# Patient Record
Sex: MALE | Race: ASIAN | NOT HISPANIC OR LATINO
[De-identification: names, ages, dates, MRNs, and addresses within clinical notes are randomized per-mention and may not be internally consistent; named-entity substitution may affect disease eponyms.]

---

## 2017-01-12 ENCOUNTER — APPOINTMENT (OUTPATIENT)
Dept: CARDIOLOGY | Facility: HOSPITAL | Age: 57
End: 2017-01-12

## 2017-01-12 VITALS
DIASTOLIC BLOOD PRESSURE: 92 MMHG | OXYGEN SATURATION: 99 % | RESPIRATION RATE: 15 BRPM | HEART RATE: 62 BPM | SYSTOLIC BLOOD PRESSURE: 171 MMHG

## 2017-01-12 VITALS
BODY MASS INDEX: 28.25 KG/M2 | HEART RATE: 72 BPM | RESPIRATION RATE: 15 BRPM | WEIGHT: 175 LBS | SYSTOLIC BLOOD PRESSURE: 196 MMHG | DIASTOLIC BLOOD PRESSURE: 109 MMHG | OXYGEN SATURATION: 98 %

## 2017-03-23 ENCOUNTER — APPOINTMENT (OUTPATIENT)
Dept: INTERNAL MEDICINE | Facility: CLINIC | Age: 57
End: 2017-03-23

## 2017-03-23 VITALS
SYSTOLIC BLOOD PRESSURE: 120 MMHG | HEART RATE: 64 BPM | DIASTOLIC BLOOD PRESSURE: 70 MMHG | HEIGHT: 66 IN | WEIGHT: 179.03 LBS | OXYGEN SATURATION: 98 % | BODY MASS INDEX: 28.77 KG/M2 | TEMPERATURE: 97.6 F

## 2017-03-24 LAB
ALT SERPL-CCNC: 21 U/L
BUN SERPL-MCNC: 17 MG/DL
CREAT SERPL-MCNC: 0.9 MG/DL
HBA1C MFR BLD HPLC: 7.1 %

## 2017-04-18 ENCOUNTER — APPOINTMENT (OUTPATIENT)
Dept: CHRONIC DISEASE MANAGEMENT | Facility: CLINIC | Age: 57
End: 2017-04-18

## 2017-05-04 ENCOUNTER — APPOINTMENT (OUTPATIENT)
Dept: CARDIOLOGY | Facility: HOSPITAL | Age: 57
End: 2017-05-04

## 2017-06-22 ENCOUNTER — APPOINTMENT (OUTPATIENT)
Dept: INTERNAL MEDICINE | Facility: CLINIC | Age: 57
End: 2017-06-22

## 2017-06-22 VITALS
OXYGEN SATURATION: 98 % | DIASTOLIC BLOOD PRESSURE: 77 MMHG | HEART RATE: 55 BPM | BODY MASS INDEX: 27.32 KG/M2 | WEIGHT: 170 LBS | TEMPERATURE: 97.5 F | SYSTOLIC BLOOD PRESSURE: 120 MMHG | HEIGHT: 66 IN

## 2017-06-23 LAB — HBA1C MFR BLD HPLC: 7.9 %

## 2017-07-21 ENCOUNTER — APPOINTMENT (OUTPATIENT)
Dept: OPHTHALMOLOGY | Facility: CLINIC | Age: 57
End: 2017-07-21

## 2017-07-27 ENCOUNTER — APPOINTMENT (OUTPATIENT)
Dept: CARDIOLOGY | Facility: HOSPITAL | Age: 57
End: 2017-07-27

## 2017-07-27 ENCOUNTER — NON-APPOINTMENT (OUTPATIENT)
Age: 57
End: 2017-07-27

## 2017-07-27 VITALS
DIASTOLIC BLOOD PRESSURE: 64 MMHG | SYSTOLIC BLOOD PRESSURE: 109 MMHG | RESPIRATION RATE: 14 BRPM | BODY MASS INDEX: 27.44 KG/M2 | HEART RATE: 60 BPM | OXYGEN SATURATION: 98 % | WEIGHT: 170 LBS

## 2017-08-09 ENCOUNTER — RX RENEWAL (OUTPATIENT)
Age: 57
End: 2017-08-09

## 2017-08-10 ENCOUNTER — APPOINTMENT (OUTPATIENT)
Dept: CARDIOLOGY | Facility: HOSPITAL | Age: 57
End: 2017-08-10

## 2017-09-06 ENCOUNTER — RX RENEWAL (OUTPATIENT)
Age: 57
End: 2017-09-06

## 2017-09-11 ENCOUNTER — RX RENEWAL (OUTPATIENT)
Age: 57
End: 2017-09-11

## 2017-10-03 ENCOUNTER — APPOINTMENT (OUTPATIENT)
Dept: INTERNAL MEDICINE | Facility: CLINIC | Age: 57
End: 2017-10-03

## 2017-11-02 ENCOUNTER — APPOINTMENT (OUTPATIENT)
Dept: CARDIOLOGY | Facility: HOSPITAL | Age: 57
End: 2017-11-02

## 2017-11-02 VITALS
HEART RATE: 58 BPM | WEIGHT: 168 LBS | BODY MASS INDEX: 27.12 KG/M2 | DIASTOLIC BLOOD PRESSURE: 79 MMHG | OXYGEN SATURATION: 98 % | SYSTOLIC BLOOD PRESSURE: 122 MMHG | RESPIRATION RATE: 15 BRPM

## 2017-11-03 ENCOUNTER — LABORATORY RESULT (OUTPATIENT)
Age: 57
End: 2017-11-03

## 2017-11-03 ENCOUNTER — APPOINTMENT (OUTPATIENT)
Dept: INTERNAL MEDICINE | Facility: CLINIC | Age: 57
End: 2017-11-03
Payer: MEDICAID

## 2017-11-03 VITALS
OXYGEN SATURATION: 98 % | WEIGHT: 168 LBS | DIASTOLIC BLOOD PRESSURE: 64 MMHG | RESPIRATION RATE: 14 BRPM | BODY MASS INDEX: 27 KG/M2 | TEMPERATURE: 97.3 F | HEIGHT: 66 IN | HEART RATE: 54 BPM | SYSTOLIC BLOOD PRESSURE: 112 MMHG

## 2017-11-03 DIAGNOSIS — Z92.29 PERSONAL HISTORY OF OTHER DRUG THERAPY: ICD-10-CM

## 2017-11-03 DIAGNOSIS — Z87.19 PERSONAL HISTORY OF OTHER DISEASES OF THE DIGESTIVE SYSTEM: ICD-10-CM

## 2017-11-03 DIAGNOSIS — Z86.19 PERSONAL HISTORY OF OTHER INFECTIOUS AND PARASITIC DISEASES: ICD-10-CM

## 2017-11-03 DIAGNOSIS — Z78.9 OTHER SPECIFIED HEALTH STATUS: ICD-10-CM

## 2017-11-03 DIAGNOSIS — Z86.010 PERSONAL HISTORY OF COLONIC POLYPS: ICD-10-CM

## 2017-11-03 DIAGNOSIS — M25.519 PAIN IN UNSPECIFIED SHOULDER: ICD-10-CM

## 2017-11-03 DIAGNOSIS — Z80.9 FAMILY HISTORY OF MALIGNANT NEOPLASM, UNSPECIFIED: ICD-10-CM

## 2017-11-03 DIAGNOSIS — Z86.39 PERSONAL HISTORY OF OTHER ENDOCRINE, NUTRITIONAL AND METABOLIC DISEASE: ICD-10-CM

## 2017-11-03 DIAGNOSIS — K25.3 ACUTE GASTRIC ULCER W/OUT HEMORRHAGE OR PERFORATION: ICD-10-CM

## 2017-11-03 PROCEDURE — 36415 COLL VENOUS BLD VENIPUNCTURE: CPT

## 2017-11-03 PROCEDURE — 99396 PREV VISIT EST AGE 40-64: CPT | Mod: 25

## 2017-11-03 PROCEDURE — G0008: CPT

## 2017-11-03 PROCEDURE — 90688 IIV4 VACCINE SPLT 0.5 ML IM: CPT

## 2017-11-03 PROCEDURE — 96372 THER/PROPH/DIAG INJ SC/IM: CPT

## 2017-11-13 LAB
ALBUMIN SERPL ELPH-MCNC: 3.9 G/DL
ALP BLD-CCNC: 104 U/L
ALT SERPL-CCNC: 20 U/L
ANION GAP SERPL CALC-SCNC: 15 MMOL/L
APPEARANCE: CLEAR
AST SERPL-CCNC: 16 U/L
BASOPHILS # BLD AUTO: 0.02 K/UL
BASOPHILS NFR BLD AUTO: 0.3 %
BILIRUB SERPL-MCNC: 0.5 MG/DL
BILIRUBIN URINE: NEGATIVE
BLOOD URINE: NEGATIVE
BUN SERPL-MCNC: 25 MG/DL
C TRACH RRNA SPEC QL NAA+PROBE: NOT DETECTED
CALCIUM SERPL-MCNC: 9.5 MG/DL
CHLORIDE SERPL-SCNC: 104 MMOL/L
CHOLEST SERPL-MCNC: 188 MG/DL
CHOLEST/HDLC SERPL: 5.2 RATIO
CO2 SERPL-SCNC: 22 MMOL/L
COLOR: YELLOW
CREAT SERPL-MCNC: 1.03 MG/DL
CREAT SPEC-SCNC: 251 MG/DL
EOSINOPHIL # BLD AUTO: 0.1 K/UL
EOSINOPHIL NFR BLD AUTO: 1.7 %
GLUCOSE QUALITATIVE U: NEGATIVE MG/DL
GLUCOSE SERPL-MCNC: 124 MG/DL
HBA1C MFR BLD HPLC: 8.6 %
HBV CORE IGG+IGM SER QL: NONREACTIVE
HBV SURFACE AB SER QL: NONREACTIVE
HBV SURFACE AG SER QL: NONREACTIVE
HCT VFR BLD CALC: 39 %
HCV AB SER QL: NONREACTIVE
HCV S/CO RATIO: 0.45 S/CO
HDLC SERPL-MCNC: 36 MG/DL
HGB BLD-MCNC: 12.7 G/DL
HIV1+2 AB SPEC QL IA.RAPID: NONREACTIVE
IMM GRANULOCYTES NFR BLD AUTO: 0 %
KETONES URINE: NEGATIVE
LDLC SERPL CALC-MCNC: 112 MG/DL
LEUKOCYTE ESTERASE URINE: NEGATIVE
LYMPHOCYTES # BLD AUTO: 2.36 K/UL
LYMPHOCYTES NFR BLD AUTO: 39.5 %
MAN DIFF?: NORMAL
MCHC RBC-ENTMCNC: 28.5 PG
MCHC RBC-ENTMCNC: 32.6 GM/DL
MCV RBC AUTO: 87.4 FL
MICROALBUMIN 24H UR DL<=1MG/L-MCNC: 32.8 MG/DL
MICROALBUMIN/CREAT 24H UR-RTO: 131 MG/G
MONOCYTES # BLD AUTO: 0.36 K/UL
MONOCYTES NFR BLD AUTO: 6 %
N GONORRHOEA RRNA SPEC QL NAA+PROBE: NOT DETECTED
NEUTROPHILS # BLD AUTO: 3.13 K/UL
NEUTROPHILS NFR BLD AUTO: 52.5 %
NITRITE URINE: NEGATIVE
PH URINE: 5
PLATELET # BLD AUTO: 227 K/UL
POTASSIUM SERPL-SCNC: 4.6 MMOL/L
PROT SERPL-MCNC: 7.4 G/DL
PROTEIN URINE: 100 MG/DL
PSA SERPL-MCNC: 1 NG/ML
RBC # BLD: 4.46 M/UL
RBC # FLD: 14.3 %
SODIUM SERPL-SCNC: 141 MMOL/L
SOURCE AMPLIFICATION: NORMAL
SPECIFIC GRAVITY URINE: 1.03
T PALLIDUM AB SER QL IA: NEGATIVE
TRIGL SERPL-MCNC: 201 MG/DL
TSH SERPL-ACNC: 1.45 UIU/ML
UROBILINOGEN URINE: NEGATIVE MG/DL
VIT B12 SERPL-MCNC: 1146 PG/ML
WBC # FLD AUTO: 5.97 K/UL

## 2017-11-15 LAB — HEMOCCULT STL QL IA: NEGATIVE

## 2018-02-01 ENCOUNTER — APPOINTMENT (OUTPATIENT)
Dept: CARDIOLOGY | Facility: HOSPITAL | Age: 58
End: 2018-02-01

## 2018-02-01 VITALS — DIASTOLIC BLOOD PRESSURE: 92 MMHG | HEART RATE: 63 BPM | SYSTOLIC BLOOD PRESSURE: 139 MMHG

## 2018-02-01 VITALS
WEIGHT: 170 LBS | OXYGEN SATURATION: 99 % | BODY MASS INDEX: 27.44 KG/M2 | RESPIRATION RATE: 12 BRPM | SYSTOLIC BLOOD PRESSURE: 158 MMHG | DIASTOLIC BLOOD PRESSURE: 94 MMHG | HEART RATE: 62 BPM

## 2018-02-05 ENCOUNTER — APPOINTMENT (OUTPATIENT)
Dept: INTERNAL MEDICINE | Facility: CLINIC | Age: 58
End: 2018-02-05
Payer: MEDICAID

## 2018-02-05 VITALS
TEMPERATURE: 97.6 F | BODY MASS INDEX: 27.32 KG/M2 | HEIGHT: 66 IN | SYSTOLIC BLOOD PRESSURE: 122 MMHG | RESPIRATION RATE: 14 BRPM | OXYGEN SATURATION: 98 % | DIASTOLIC BLOOD PRESSURE: 80 MMHG | HEART RATE: 54 BPM | WEIGHT: 170 LBS

## 2018-02-05 PROCEDURE — 36415 COLL VENOUS BLD VENIPUNCTURE: CPT

## 2018-02-05 PROCEDURE — 99215 OFFICE O/P EST HI 40 MIN: CPT | Mod: 25

## 2018-02-05 PROCEDURE — 90471 IMMUNIZATION ADMIN: CPT

## 2018-02-05 PROCEDURE — 90746 HEPB VACCINE 3 DOSE ADULT IM: CPT

## 2018-02-07 LAB
ANION GAP SERPL CALC-SCNC: 18 MMOL/L
BASOPHILS # BLD AUTO: 0.03 K/UL
BASOPHILS NFR BLD AUTO: 0.4 %
BUN SERPL-MCNC: 19 MG/DL
CALCIUM SERPL-MCNC: 9.6 MG/DL
CHLORIDE SERPL-SCNC: 102 MMOL/L
CHOLEST SERPL-MCNC: 160 MG/DL
CHOLEST/HDLC SERPL: 3.6 RATIO
CO2 SERPL-SCNC: 20 MMOL/L
CREAT SERPL-MCNC: 0.99 MG/DL
CREAT SPEC-SCNC: 249 MG/DL
CREAT/PROT UR: 0.4 RATIO
EOSINOPHIL # BLD AUTO: 0.16 K/UL
EOSINOPHIL NFR BLD AUTO: 2.4 %
GLUCOSE SERPL-MCNC: 96 MG/DL
HBA1C MFR BLD HPLC: 8.5 %
HCT VFR BLD CALC: 41 %
HDLC SERPL-MCNC: 45 MG/DL
HGB BLD-MCNC: 13.2 G/DL
IMM GRANULOCYTES NFR BLD AUTO: 0 %
LDLC SERPL CALC-MCNC: 87 MG/DL
LYMPHOCYTES # BLD AUTO: 2.85 K/UL
LYMPHOCYTES NFR BLD AUTO: 42.1 %
MAN DIFF?: NORMAL
MCHC RBC-ENTMCNC: 28.3 PG
MCHC RBC-ENTMCNC: 32.2 GM/DL
MCV RBC AUTO: 87.8 FL
MONOCYTES # BLD AUTO: 0.53 K/UL
MONOCYTES NFR BLD AUTO: 7.8 %
NEUTROPHILS # BLD AUTO: 3.2 K/UL
NEUTROPHILS NFR BLD AUTO: 47.3 %
PLATELET # BLD AUTO: 189 K/UL
POTASSIUM SERPL-SCNC: 4.6 MMOL/L
PROT UR-MCNC: 107 MG/DL
RBC # BLD: 4.67 M/UL
RBC # FLD: 14.9 %
SODIUM SERPL-SCNC: 140 MMOL/L
TRIGL SERPL-MCNC: 142 MG/DL
WBC # FLD AUTO: 6.77 K/UL

## 2018-04-25 ENCOUNTER — RX RENEWAL (OUTPATIENT)
Age: 58
End: 2018-04-25

## 2018-04-30 ENCOUNTER — RX RENEWAL (OUTPATIENT)
Age: 58
End: 2018-04-30

## 2018-05-03 ENCOUNTER — NON-APPOINTMENT (OUTPATIENT)
Age: 58
End: 2018-05-03

## 2018-05-03 ENCOUNTER — APPOINTMENT (OUTPATIENT)
Dept: CARDIOLOGY | Facility: HOSPITAL | Age: 58
End: 2018-05-03

## 2018-05-03 VITALS — SYSTOLIC BLOOD PRESSURE: 127 MMHG | DIASTOLIC BLOOD PRESSURE: 77 MMHG

## 2018-05-03 VITALS
HEART RATE: 59 BPM | BODY MASS INDEX: 26.95 KG/M2 | OXYGEN SATURATION: 97 % | WEIGHT: 167 LBS | RESPIRATION RATE: 12 BRPM | SYSTOLIC BLOOD PRESSURE: 144 MMHG | DIASTOLIC BLOOD PRESSURE: 76 MMHG

## 2018-05-09 ENCOUNTER — FORM ENCOUNTER (OUTPATIENT)
Age: 58
End: 2018-05-09

## 2018-05-10 ENCOUNTER — APPOINTMENT (OUTPATIENT)
Dept: RADIOLOGY | Facility: HOSPITAL | Age: 58
End: 2018-05-10

## 2018-05-22 ENCOUNTER — APPOINTMENT (OUTPATIENT)
Dept: INTERNAL MEDICINE | Facility: CLINIC | Age: 58
End: 2018-05-22
Payer: MEDICAID

## 2018-05-22 VITALS
SYSTOLIC BLOOD PRESSURE: 124 MMHG | RESPIRATION RATE: 14 BRPM | HEART RATE: 73 BPM | WEIGHT: 168 LBS | BODY MASS INDEX: 27 KG/M2 | TEMPERATURE: 98.1 F | HEIGHT: 66 IN | DIASTOLIC BLOOD PRESSURE: 70 MMHG | OXYGEN SATURATION: 98 %

## 2018-05-22 PROCEDURE — 36415 COLL VENOUS BLD VENIPUNCTURE: CPT

## 2018-05-22 PROCEDURE — 99215 OFFICE O/P EST HI 40 MIN: CPT | Mod: 25

## 2018-05-22 PROCEDURE — 90746 HEPB VACCINE 3 DOSE ADULT IM: CPT

## 2018-05-22 PROCEDURE — 90471 IMMUNIZATION ADMIN: CPT

## 2018-05-22 NOTE — REVIEW OF SYSTEMS
[Negative] : Psychiatric [see HPI] : see HPI [Fever] : no fever [Chills] : no chills [Feeling Poorly] : not feeling poorly [Feeling Tired] : not feeling tired [Chest Pain] : no chest pain [Palpitations] : no palpitations [Lower Ext Edema] : no extremity edema [SOB on Exertion] : no shortness of breath during exertion [Abdominal Pain] : no abdominal pain [Heartburn] : no heartburn [Melena] : no melena [Dizziness] : no dizziness [Fainting] : no fainting

## 2018-05-22 NOTE — PHYSICAL EXAM
[General Appearance - Alert] : alert [General Appearance - In No Acute Distress] : in no acute distress [Sclera] : the sclera and conjunctiva were normal [PERRL With Normal Accommodation] : pupils were equal in size, round, and reactive to light [Extraocular Movements] : extraocular movements were intact [Outer Ear] : the ears and nose were normal in appearance [Neck Appearance] : the appearance of the neck was normal [Thyroid Diffuse Enlargement] : the thyroid was not enlarged [Respiration, Rhythm And Depth] : normal respiratory rhythm and effort [Auscultation Breath Sounds / Voice Sounds] : lungs were clear to auscultation bilaterally [Heart Rate And Rhythm] : heart rate was normal and rhythm regular [Heart Sounds] : normal S1 and S2 [Murmurs] : no murmurs [Full Pulse] : the pedal pulses are present [Edema] : there was no peripheral edema [Bowel Sounds] : normal bowel sounds [Abdomen Soft] : soft [Abdomen Tenderness] : non-tender [Cervical Lymph Nodes Enlarged Posterior Bilaterally] : posterior cervical [Cervical Lymph Nodes Enlarged Anterior Bilaterally] : anterior cervical [Supraclavicular Lymph Nodes Enlarged Bilaterally] : supraclavicular [No CVA Tenderness] : no ~M costovertebral angle tenderness [No Spinal Tenderness] : no spinal tenderness [Abnormal Walk] : normal gait [Musculoskeletal - Swelling] : no joint swelling seen [No Focal Deficits] : no focal deficits [Oriented To Time, Place, And Person] : oriented to person, place, and time [Affect] : the affect was normal [Mood] : the mood was normal [Moist] : moist mucosa [Cobblestoning] : cobblestoning [] : no respiratory distress [Erythema] : no erythema [Exudate] : no exudate [FreeTextEntry1] : scattered freckles [Over the Past 2 Weeks, Have You Felt Down, Depressed, or Hopeless?] : 1.) Over the past 2 weeks, have you felt down, depressed, or hopeless? No [Over the Past 2 Weeks, Have You Felt Little Interest or Pleasure Doing Things?] : 2.) Over the past 2 weeks, have you felt little interest or pleasure doing things? No

## 2018-05-22 NOTE — HISTORY OF PRESENT ILLNESS
[FreeTextEntry1] : \par Feeling well, no complaints.\par Needs med refills.\par \par \par c/o cough on/off x 2 mo a/w postnasal drip and frequent throat clearing\par -occ OTC cough meds, not taken regularly\par -denies hx seasonal allergies\par -denies reflux, sob, CP, fever or chills.\par -hx negative cxr per cardio 5/18\par \par c/o neck pain x 3 d, only with neck movement, felt in on awakening some days, advil helps\par -denies HA, vision trouble\par \par hx CAD s/p CABG 12/2011, CHF, HTN, HLD-\par -followed by cardio- states last seen 5/18 no changes made, had negative cxr for c/o cough.\par -better with ADA/low salt and fat diet in recent months\par -denies HA, dizziness, CP, sob, leg edema, orthopnea or PND\par -followed by optho, seen 7/17- states told nl dilated exam and has f/u 7/18\par \par DM- reports diet adherence since last visit, cut down on carbs\par -on metformin, glimepiride and Onglyza- reports 100% compliant\par -home fs random: 130- 200; denies hypoglycemic sx's \par -followed by optho, last 7/17\par -followed by podiatry, seen 2016 told nl.  Denies foot pain or complaints.\par -not formal exercise, stays active\par -declines DM educator eval\par \par \par Denies GI or  complaints.\par \par

## 2018-05-22 NOTE — ASSESSMENT
[FreeTextEntry1] : \par cough- suspect 2/2 postnasal drip; well appearing\par -trial of flonase, escribed\par -advised trial of OTC antihistamine, advised to avoid "D" containing (declines Rx)\par -hx negative cxr 5/18\par -advised ENT referral if sx's persist, states will call back for this\par -advised to f/u if sx's worsen\par \par neck pain- suspect muscular\par -advised d/c advil, try Tyelnol prn\par -stretching, heat\par -to modify pillow as able\par \par hx CAD s/p CABG 12/2011, CHF, HTN, HLD- asx\par -11/17 Tchol 188   HDL 36, LFTs wnl\par -11/17 UA: +rt--> 2/18 prt/crt abnl, renal eval pending\par -6/16 echo: mild to mod LV dysfxn, inferoseptal hypokinesis\par -followed by cardio- states last seen 3/18, no changes made, has f/u 8/18\par -on ASA/lipitor/coreg/lasix/losartan (hx cough 2/2 ACE-I).  Importance of med adherence counseled- adverse outcome with nonadherence (ie MI, death) counseled.\par -followed by optho, seen 7/17- states told nl dilated exam and has f/u 7/18\par -low salt and fat diet advised\par \par DM- 2/18 A1c 8.5 (was 8.6); 11/17 microalbumin +\par -on metformin, glimepiride and Onglyza- taking regimen > 1 yr per pt.  Medication adherence counseled, adverse effects of nonadherence counseled.\par -on ARB\par -cont home fs monitoring\par -followed by optho, has f/u 7/18\par -followed by podiatry, seen 2016 told nl.  Denies foot pain or complaints.  DM foot exam wnl 11/17 CPE.\par -check A1c \par -ADA diet, exercise advised\par -declines DM educator referral\par \par hx anemia, B12 def- asx\par -11/17 B12 wnl, Hg 12.7 (stable)--> cbc wnl 2/18\par -11/17 FIT negative\par -on B12 supplement\par -hx screening colonoscopy 8/12: polyps, internal hemorrhoids, rec: repeat in 5 yrs (Dr. Davis)- GI f/u advised\par \par microscopic hematuria- asx\par - referral for eval given prior- pending\par \par overweight- \par -healthy eating, exercise and wt loss counseled.\par \par \par HCM\par -hx CPE 11/17\par -STD prevention with regular use of condoms counseled\par -hx flu shot 11/17\par -hx Tdap 2012\par -hx PVX 2012\par -hx prevnar 9/16\par -getting hep B series, for #3 today \par -hx screening colonoscopy 8/12: polyps, internal hemorrhoids, rec: repeat in 5 yrs (Dr. Davis)- GI f/u advised, hesitant, 11/17 FIT card- negative\par -yearly derm screening for skin cancer screening and regular use of sun block for skin cancer prevention counseled\par -cont'd smoking cessation encouraged\par \par Pt's cell: [phone]

## 2018-05-25 LAB
ANION GAP SERPL CALC-SCNC: 10 MMOL/L
BUN SERPL-MCNC: 18 MG/DL
CALCIUM SERPL-MCNC: 8.6 MG/DL
CHLORIDE SERPL-SCNC: 106 MMOL/L
CO2 SERPL-SCNC: 22 MMOL/L
CREAT SERPL-MCNC: 0.86 MG/DL
CREAT SPEC-SCNC: 309 MG/DL
GLUCOSE SERPL-MCNC: 237 MG/DL
HBA1C MFR BLD HPLC: 10.9 %
MICROALBUMIN 24H UR DL<=1MG/L-MCNC: 84.2 MG/DL
MICROALBUMIN/CREAT 24H UR-RTO: 272 MG/G
POTASSIUM SERPL-SCNC: 4.7 MMOL/L
SODIUM SERPL-SCNC: 138 MMOL/L

## 2018-07-23 ENCOUNTER — RX RENEWAL (OUTPATIENT)
Age: 58
End: 2018-07-23

## 2018-08-28 ENCOUNTER — APPOINTMENT (OUTPATIENT)
Dept: INTERNAL MEDICINE | Facility: CLINIC | Age: 58
End: 2018-08-28
Payer: MEDICAID

## 2018-08-28 VITALS
TEMPERATURE: 97.9 F | RESPIRATION RATE: 14 BRPM | HEART RATE: 60 BPM | WEIGHT: 171 LBS | BODY MASS INDEX: 27.48 KG/M2 | OXYGEN SATURATION: 97 % | DIASTOLIC BLOOD PRESSURE: 70 MMHG | HEIGHT: 66 IN | SYSTOLIC BLOOD PRESSURE: 120 MMHG

## 2018-08-28 DIAGNOSIS — Z87.09 PERSONAL HISTORY OF OTHER DISEASES OF THE RESPIRATORY SYSTEM: ICD-10-CM

## 2018-08-28 PROCEDURE — 36415 COLL VENOUS BLD VENIPUNCTURE: CPT

## 2018-08-28 PROCEDURE — 99214 OFFICE O/P EST MOD 30 MIN: CPT | Mod: 25

## 2018-08-28 RX ORDER — BLOOD-GLUCOSE METER
KIT MISCELLANEOUS
Qty: 1 | Refills: 0 | Status: DISCONTINUED | COMMUNITY
Start: 2017-06-22 | End: 2018-08-28

## 2018-08-28 NOTE — REVIEW OF SYSTEMS
[see HPI] : see HPI [Negative] : Musculoskeletal [Fever] : no fever [Chills] : no chills [Feeling Poorly] : not feeling poorly [Feeling Tired] : not feeling tired [Chest Pain] : no chest pain [Palpitations] : no palpitations [Lower Ext Edema] : no extremity edema [Cough] : no cough [SOB on Exertion] : no shortness of breath during exertion [Abdominal Pain] : no abdominal pain [Heartburn] : no heartburn [Melena] : no melena [Dizziness] : no dizziness [Fainting] : no fainting

## 2018-08-28 NOTE — PHYSICAL EXAM
[General Appearance - Alert] : alert [General Appearance - In No Acute Distress] : in no acute distress [Sclera] : the sclera and conjunctiva were normal [PERRL With Normal Accommodation] : pupils were equal in size, round, and reactive to light [Extraocular Movements] : extraocular movements were intact [Outer Ear] : the ears and nose were normal in appearance [Moist] : moist mucosa [Cobblestoning] : cobblestoning [Neck Appearance] : the appearance of the neck was normal [Thyroid Diffuse Enlargement] : the thyroid was not enlarged [Respiration, Rhythm And Depth] : normal respiratory rhythm and effort [Auscultation Breath Sounds / Voice Sounds] : lungs were clear to auscultation bilaterally [Heart Rate And Rhythm] : heart rate was normal and rhythm regular [Heart Sounds] : normal S1 and S2 [Murmurs] : no murmurs [Full Pulse] : the pedal pulses are present [Edema] : there was no peripheral edema [Bowel Sounds] : normal bowel sounds [Abdomen Soft] : soft [Abdomen Tenderness] : non-tender [Cervical Lymph Nodes Enlarged Posterior Bilaterally] : posterior cervical [Cervical Lymph Nodes Enlarged Anterior Bilaterally] : anterior cervical [Supraclavicular Lymph Nodes Enlarged Bilaterally] : supraclavicular [No CVA Tenderness] : no ~M costovertebral angle tenderness [No Spinal Tenderness] : no spinal tenderness [Abnormal Walk] : normal gait [Musculoskeletal - Swelling] : no joint swelling seen [] : no rash [No Focal Deficits] : no focal deficits [Oriented To Time, Place, And Person] : oriented to person, place, and time [Affect] : the affect was normal [Mood] : the mood was normal [Erythema] : no erythema [Exudate] : no exudate [Over the Past 2 Weeks, Have You Felt Down, Depressed, or Hopeless?] : 1.) Over the past 2 weeks, have you felt down, depressed, or hopeless? No [Over the Past 2 Weeks, Have You Felt Little Interest or Pleasure Doing Things?] : 2.) Over the past 2 weeks, have you felt little interest or pleasure doing things? No [Nasal Cavity] : the nasal mucosa and septum were normal [Oropharynx] : the oropharynx was normal [FreeTextEntry1] : scattered freckles

## 2018-08-28 NOTE — ASSESSMENT
[FreeTextEntry1] : \par hx cough- suspect 2/2 postnasal drip- self resolved per pt\par -advised trial of flonase prn\par -advised trial of OTC antihistamine, advised to avoid "D" containing (declines Rx)\par -hx negative cxr 5/18\par \par hx CAD s/p CABG 12/2011, CHF, HTN, HLD, proteinuria- asx\par -11/17 Tchol 188   HDL 36, LFTs wnl\par -11/17 UA: +prt--> 2/18 prt/crt abnl, renal eval pending.  US renal pending.\par -6/16 echo: mild to mod LV dysfxn, inferoseptal hypokinesis\par -5/18 bmp wnl\par -followed by cardio- states last seen 3/18, no changes made, has f/u 9/18\par -on ASA/lipitor/coreg/lasix/losartan (hx cough 2/2 ACE-I).  Importance of med adherence counseled- adverse outcome with nonadherence (ie MI, death) counseled.\par -followed by optho, seen 7/17- states told nl dilated exam - f/u pending\par -low salt and fat diet advised\par \par DM- 5/18 A1c 10.9 (was 8.5); 5/18 microalbumin +\par -on metformin, glimepiride and Onglyza- taking regimen > 1 yr per pt.  Medication adherence counseled, adverse effects of nonadherence counseled.\par -on ARB\par -cont home fs monitoring encouraged- Rx for machine and supplies escribed\par -followed by optho, f/u pending\par -followed by podiatry, seen 2016 told nl.  Denies foot pain or complaints.  DM foot exam wnl 11/17 CPE.\par -ADA diet, exercise advised\par -declines DM educator referral\par -check A1c and microalbumin\par \par hx anemia, B12 def- asx\par -11/17 B12 wnl, Hg 12.7 (stable)--> cbc wnl 2/18\par -11/17 FIT negative\par -on B12 supplement\par -hx screening colonoscopy 8/12: polyps, internal hemorrhoids, rec: repeat in 5 yrs (Dr. Davis)- GI f/u advised\par \par microscopic hematuria- asx\par - referral for eval given prior- pending\par \par overweight- \par -healthy eating, exercise and wt loss counseled.\par \par \par HCM\par -hx CPE 11/17, yearly advised\par -STD prevention with regular use of condoms counseled\par -hx flu shot 11/17\par -hx Tdap 2012\par -hx PVX 2012\par -hx prevnar 9/16\par -getting hep B series\par -hx screening colonoscopy 8/12: polyps, internal hemorrhoids, rec: repeat in 5 yrs (Dr. Davis)- GI f/u advised, hesitant, 11/17 FIT card- negative\par -yearly derm screening for skin cancer screening and regular use of sun block for skin cancer prevention counseled\par -cont'd smoking cessation encouraged\par \par Pt's cell: [phone]

## 2018-08-28 NOTE — HISTORY OF PRESENT ILLNESS
[FreeTextEntry1] : \par Feeling well.\par Needs med refills.\par \par \par hx cough on/off x 2 mo a/w postnasal drip and frequent throat clearing- self resolved since last visit, not using any medication, denies trial flonase\par -denies hx seasonal allergies\par -denies reflux, sob, CP, fever or chills.\par -hx negative cxr per cardio 5/18\par \par hx CAD s/p CABG 12/2011, CHF, HTN, HLD-\par -followed by cardio- states last seen 5/18 no changes made, had negative cxr for c/o cough.  Has f/u 9/18.\par -better with ADA/low salt and fat diet in recent months\par -denies HA, dizziness, CP, sob, leg edema, orthopnea or PND\par -followed by optho, seen 7/17- states told nl dilated exam, f/u pending\par \par DM- reports diet adherence since last visit, has low carb diet but admits to eating late due to work schedule\par -on metformin, glimepiride and Onglyza- reports 100% compliant\par -no recent home fs checks as machine not working- needs new.  Denies hypoglycemic sx's, polyuria or polydipsia. \par -followed by optho, last 7/17\par -followed by podiatry, seen 2016 told nl.  Denies foot pain or complaints.\par -not formal exercise, stays active\par -declines DM educator eval\par \par \par Denies GI or  complaints.\par \par

## 2018-08-31 LAB
ANION GAP SERPL CALC-SCNC: 12 MMOL/L
BUN SERPL-MCNC: 19 MG/DL
CALCIUM SERPL-MCNC: 8.8 MG/DL
CHLORIDE SERPL-SCNC: 103 MMOL/L
CO2 SERPL-SCNC: 19 MMOL/L
CREAT SERPL-MCNC: 0.74 MG/DL
CREAT SPEC-SCNC: 138 MG/DL
GLUCOSE SERPL-MCNC: 276 MG/DL
HBA1C MFR BLD HPLC: 10.1 %
MICROALBUMIN 24H UR DL<=1MG/L-MCNC: 87.8 MG/DL
MICROALBUMIN/CREAT 24H UR-RTO: 637 MG/G
POTASSIUM SERPL-SCNC: 4.6 MMOL/L
SODIUM SERPL-SCNC: 134 MMOL/L

## 2018-09-08 ENCOUNTER — RX RENEWAL (OUTPATIENT)
Age: 58
End: 2018-09-08

## 2018-09-27 ENCOUNTER — APPOINTMENT (OUTPATIENT)
Dept: CARDIOLOGY | Facility: HOSPITAL | Age: 58
End: 2018-09-27

## 2018-09-27 VITALS
BODY MASS INDEX: 27.44 KG/M2 | OXYGEN SATURATION: 97 % | WEIGHT: 170 LBS | SYSTOLIC BLOOD PRESSURE: 149 MMHG | RESPIRATION RATE: 12 BRPM | HEART RATE: 68 BPM | DIASTOLIC BLOOD PRESSURE: 82 MMHG

## 2018-09-27 VITALS — DIASTOLIC BLOOD PRESSURE: 78 MMHG | SYSTOLIC BLOOD PRESSURE: 124 MMHG

## 2018-10-22 ENCOUNTER — APPOINTMENT (OUTPATIENT)
Dept: OPHTHALMOLOGY | Facility: CLINIC | Age: 58
End: 2018-10-22

## 2018-11-08 ENCOUNTER — MEDICATION RENEWAL (OUTPATIENT)
Age: 58
End: 2018-11-08

## 2018-11-26 ENCOUNTER — APPOINTMENT (OUTPATIENT)
Dept: OPHTHALMOLOGY | Facility: CLINIC | Age: 58
End: 2018-11-26

## 2018-12-27 ENCOUNTER — APPOINTMENT (OUTPATIENT)
Dept: CARDIOLOGY | Facility: HOSPITAL | Age: 58
End: 2018-12-27

## 2018-12-27 VITALS
BODY MASS INDEX: 28.41 KG/M2 | DIASTOLIC BLOOD PRESSURE: 75 MMHG | SYSTOLIC BLOOD PRESSURE: 149 MMHG | WEIGHT: 176 LBS | HEART RATE: 65 BPM | OXYGEN SATURATION: 98 % | RESPIRATION RATE: 14 BRPM

## 2019-01-03 ENCOUNTER — NON-APPOINTMENT (OUTPATIENT)
Age: 59
End: 2019-01-03

## 2019-01-04 NOTE — HISTORY OF PRESENT ILLNESS
[FreeTextEntry1] : 57-year-old M with a PMH of CAD S/P 4-vessel CABG (12/2011), mild-moderate LV dysfunction, HTN, dyslipidemia and diabetes following up in cardiology clinic for optimization of his CAD and heart failure regimen. No longer having cough, no fevers. No SOB or chest pain. No LE edema or palp. No orthopnea. No F/C. \par Has admitted to gaining 3 or 4 pounds over the holiday, has been eating a lot of sweets. Has not been walking as much as he used to.

## 2019-01-04 NOTE — REVIEW OF SYSTEMS
[Cough] : cough [Negative] : Psychiatric [Shortness Of Breath] : no shortness of breath [Dyspnea on exertion] : not dyspnea during exertion [Chest Pain] : no chest pain [Lower Ext Edema] : no extremity edema [Palpitations] : no palpitations

## 2019-01-04 NOTE — PHYSICAL EXAM
[Normal Appearance] : normal appearance [General Appearance - In No Acute Distress] : no acute distress [Normal Conjunctiva] : the conjunctiva exhibited no abnormalities [Normal Oral Mucosa] : normal oral mucosa [] : no respiratory distress [Auscultation Breath Sounds / Voice Sounds] : lungs were clear to auscultation bilaterally [Heart Sounds] : normal S1 and S2 [Murmurs] : no murmurs present [Edema] : no peripheral edema present [Bowel Sounds] : normal bowel sounds [Abdomen Soft] : soft [Abdomen Tenderness] : non-tender [Nail Clubbing] : no clubbing of the fingernails [Skin Turgor] : normal skin turgor [Affect] : the affect was normal [FreeTextEntry1] : No JVD

## 2019-01-04 NOTE — DISCUSSION/SUMMARY
[FreeTextEntry1] : Mr. Joanne Smyth is a 57 year old gentleman with a CAD status post 4-vessel CABG, HFrEF (ICM, mild-mod LV dysfunction) previously with a LifeVest, diabetes following up in cardiology clinic for optimization of his CAD and heart failure regimen.\par \par \par # Heart Failure -   On an excellent regimen of beta blocker (Coreg), ACE/ARB (losartan, had possible ACE-inhibitor associated cough with lisinopril), diuretic (lasix). Currently compensated. \par -  ASA 81mg PO Qday\par -  Coreg 25mg PO BID\par -  Lipitor 80mg PO Qday\par -  Losartan 100mg PO Qday\par -  Lasix 20mg PO Qday (up to 40mg PRN Weight Gain)\par - Diet and exercise counseling provided during visit\par \par \par # HTN - Well controlled on Coreg and Losartan.   \par -  Continue current regimen for now\par \par \par # HYPERLIPIDEMIA \par -continue high dose statin\par -FLP as above\par \par # Coronary Artery Disease S/P CABG - He will need to continue aspirin daily.  Otherwise, he is to continue his beta blocker and his BP and HR are at optimal limits.\par \par \par FOLLOW UP IN 6 months

## 2019-01-08 ENCOUNTER — APPOINTMENT (OUTPATIENT)
Dept: INTERNAL MEDICINE | Facility: CLINIC | Age: 59
End: 2019-01-08
Payer: MEDICAID

## 2019-01-08 ENCOUNTER — LABORATORY RESULT (OUTPATIENT)
Age: 59
End: 2019-01-08

## 2019-01-08 VITALS
HEIGHT: 66 IN | SYSTOLIC BLOOD PRESSURE: 116 MMHG | WEIGHT: 172 LBS | DIASTOLIC BLOOD PRESSURE: 74 MMHG | TEMPERATURE: 98.3 F | RESPIRATION RATE: 14 BRPM | HEART RATE: 74 BPM | BODY MASS INDEX: 27.64 KG/M2 | OXYGEN SATURATION: 98 %

## 2019-01-08 PROCEDURE — 90686 IIV4 VACC NO PRSV 0.5 ML IM: CPT

## 2019-01-08 PROCEDURE — G0008: CPT

## 2019-01-08 PROCEDURE — 83036 HEMOGLOBIN GLYCOSYLATED A1C: CPT | Mod: QW

## 2019-01-08 PROCEDURE — 99396 PREV VISIT EST AGE 40-64: CPT | Mod: 25

## 2019-01-08 PROCEDURE — 36415 COLL VENOUS BLD VENIPUNCTURE: CPT

## 2019-01-09 NOTE — ASSESSMENT
[FreeTextEntry1] : \par hx CAD s/p CABG 12/2011, CHF, HTN, HLD, proteinuria- asx\par -11/17 Tchol 188   HDL 36\par -11/17 UA: +prt--> 2/18 prt/crt abnl, renal eval pending.  US renal pending.\par -6/16 echo: mild to mod LV dysfxn, inferoseptal hypokinesis\par -8/18 bmp wnl\par -followed by cardio- states last seen 12/18, no changes made, has f/u 3/19\par -renal eval pending\par -on ASA/lipitor/coreg/lasix/losartan (hx cough 2/2 ACE-I).  Importance of med adherence counseled- adverse outcome with nonadherence (ie MI, death) counseled.\par -followed by optho, seen 7/17- states told nl dilated exam - f/u pending\par -low salt and fat diet advised\par \par DM- 8/18 A1c 10.1 (was 10.9); 5/18 microalbumin +\par -endo referral given\par -on metformin, glimepiride and Onglyza- taking regimen > 1 yr per pt.  Medication adherence counseled, adverse effects of nonadherence counseled.\par -on ARB\par -cont home fs monitoring encouraged- Rx for machine and supplies escribed\par -followed by optho, f/u pending\par -followed by podiatry, seen 2016 told nl.  Referral given.  Denies foot pain or complaints.  Good DM foot hygiene counseled\par -ADA diet, exercise advised\par -declines DM educator referral\par -check A1c and microalbumin\par \par hx anemia, B12 def- asx\par -11/17 B12 wnl, Hg 12.7 (stable)--> cbc wnl 2/18\par -11/17 FIT negative\par -on B12 supplement\par -hx screening colonoscopy 8/12: polyps, internal hemorrhoids, rec: repeat in 5 yrs (Dr. Davis)- GI f/u advised\par -check FIT, cbc\par \par microscopic hematuria- asx\par - referral for eval given prior- pending\par -check UA/Ucx\par \par overweight- \par -healthy eating, exercise and wt loss counseled.\par \par \par HCM\par -check screening labs; declines HIV/STD screening\par -STD prevention with regular use of condoms counseled\par -flu shot today\par -hx Tdap 2012\par -hx PVX 2012\par -hx prevnar 9/16\par -hx hep B series\par -hx screening colonoscopy 8/12: polyps, internal hemorrhoids, rec: repeat in 5 yrs (Dr. Davis)- GI f/u advised, hesitant, 11/17 FIT card- negative.  For repeat FIT.\par -derm referral for screening.  Regular use of sun block for skin cancer prevention counseled\par -cont'd smoking cessation encouraged\par \par Pt's cell: [phone]

## 2019-01-09 NOTE — PHYSICAL EXAM
[General Appearance - Alert] : alert [General Appearance - In No Acute Distress] : in no acute distress [Sclera] : the sclera and conjunctiva were normal [PERRL With Normal Accommodation] : pupils were equal in size, round, and reactive to light [Extraocular Movements] : extraocular movements were intact [Outer Ear] : the ears and nose were normal in appearance [Nasal Cavity] : the nasal mucosa and septum were normal [Oropharynx] : the oropharynx was normal [Neck Appearance] : the appearance of the neck was normal [Thyroid Diffuse Enlargement] : the thyroid was not enlarged [Respiration, Rhythm And Depth] : normal respiratory rhythm and effort [Auscultation Breath Sounds / Voice Sounds] : lungs were clear to auscultation bilaterally [Heart Rate And Rhythm] : heart rate was normal and rhythm regular [Heart Sounds] : normal S1 and S2 [Murmurs] : no murmurs [Full Pulse] : the pedal pulses are present [Edema] : there was no peripheral edema [Bowel Sounds] : normal bowel sounds [Abdomen Soft] : soft [Abdomen Tenderness] : non-tender [Cervical Lymph Nodes Enlarged Posterior Bilaterally] : posterior cervical [Cervical Lymph Nodes Enlarged Anterior Bilaterally] : anterior cervical [Supraclavicular Lymph Nodes Enlarged Bilaterally] : supraclavicular [No CVA Tenderness] : no ~M costovertebral angle tenderness [No Spinal Tenderness] : no spinal tenderness [Abnormal Walk] : normal gait [Musculoskeletal - Swelling] : no joint swelling seen [No Focal Deficits] : no focal deficits [Oriented To Time, Place, And Person] : oriented to person, place, and time [Affect] : the affect was normal [Mood] : the mood was normal [Both Tympanic Membranes Were Examined] : both tympanic membranes were normal [Thyroid Nodule] : there were no palpable thyroid nodules [] : no hepato-splenomegaly [FreeTextEntry1] : scattered freckles

## 2019-01-09 NOTE — REVIEW OF SYSTEMS
[Negative] : ENT [Fever] : no fever [Chills] : no chills [Feeling Poorly] : not feeling poorly [Feeling Tired] : not feeling tired [Chest Pain] : no chest pain [Palpitations] : no palpitations [Lower Ext Edema] : no extremity edema [Cough] : no cough [SOB on Exertion] : no shortness of breath during exertion [Abdominal Pain] : no abdominal pain [Heartburn] : no heartburn [Melena] : no melena [Dizziness] : no dizziness [Fainting] : no fainting

## 2019-01-09 NOTE — HISTORY OF PRESENT ILLNESS
[Health Maintenance] : health maintenance [___ Year(s) Ago] : [unfilled] year(s) ago [Unmarried] : unmarried [Working Full Time] : working full time [Occupation ___] : occupation: [unfilled] [Former Cigarette Smoker] : is a former cigarette smoker [Rare Use] : rare alcohol use [Never] : has never used illicit drugs [Good] : good [Reg. Dental Visits] : He has regular dental visits [Quit Cigarettes: ___] : quit smoking cigarettes: [unfilled] [Last Colonoscopy ___] : last colonoscopy done [unfilled] [Binge Drinking] : denies binge drinking [Patient Concern] : no personal concern about alcohol use [Family Concern] : no family concern about alcohol use [Vision Problems] : He denies vision problems [Hearing Loss] : He denies hearing loss [Healthy Diet] : He does not have a healthy diet [Regular Exercise] : He does not exercise regularly [Tobacco Use] : He does not use tobacco [de-identified] : room mate [de-identified] : hx flu shot 11/17, hx Tdap 2012, hx PVX 2012, hx prevnar 9/16, ? hx shingles vaccine  [FreeTextEntry1] : \par Feeling well.\par Does not need med refills.\par \par \par hx CAD s/p CABG 12/2011, CHF, HTN, HLD-\par -followed by cardio- states last seen 12/18 no changes made, advised f/u in 6 mo\par -better with ADA/low salt and fat diet in recent months\par -denies HA, dizziness, CP, sob, leg edema, orthopnea or PND\par -followed by optho, seen 7/17- states told nl dilated exam, f/u pending\par \par DM- reports diet adherence since last visit, due to holidays.  Also missed all meds on/off for "total of 1 month" due to travel - has since bought pillbox to avoid in future\par -on metformin and Onglyza\par -on glimepiride, dose increased to 2mg qd 8/18\par -home fs(random) 120-250 Denies hypoglycemic sx's, polyuria or polydipsia. \par -followed by optho, last 7/17\par -followed by podiatry, seen 2016 told nl.  Denies foot pain or complaints.\par -not formal exercise, stays active\par -declines DM educator eval\par \par Reports nl appetite and BMs.  Reports stable wt in past yr.\par Denies GI complaints.\par \par Not sexually active, denies hx STD dx.\par Denies  complaints.\par \par Denies depression or anxiety.\par

## 2019-01-09 NOTE — HEALTH RISK ASSESSMENT
[0] : 2) Feeling down, depressed, or hopeless: Not at all (0) [HIV test declined] : HIV test declined [ColonoscopyDate] : 8/12 [HIVDate] : 11/17 [HIVComments] : negative [HepatitisCDate] : 11/17 [HepatitisCComments] : negative

## 2019-01-11 LAB
25(OH)D3 SERPL-MCNC: 12.6 NG/ML
ALBUMIN SERPL ELPH-MCNC: 3.9 G/DL
ALP BLD-CCNC: 93 U/L
ALT SERPL-CCNC: 28 U/L
ANION GAP SERPL CALC-SCNC: 15 MMOL/L
APPEARANCE: CLEAR
AST SERPL-CCNC: 16 U/L
BACTERIA UR CULT: NORMAL
BASOPHILS # BLD AUTO: 0.02 K/UL
BASOPHILS NFR BLD AUTO: 0.4 %
BILIRUB SERPL-MCNC: 0.4 MG/DL
BILIRUBIN URINE: NEGATIVE
BLOOD URINE: NEGATIVE
BUN SERPL-MCNC: 23 MG/DL
CALCIUM SERPL-MCNC: 9.2 MG/DL
CHLORIDE SERPL-SCNC: 101 MMOL/L
CHOLEST SERPL-MCNC: 211 MG/DL
CHOLEST/HDLC SERPL: 5.9 RATIO
CO2 SERPL-SCNC: 22 MMOL/L
COLOR: ABNORMAL
CREAT SERPL-MCNC: 0.95 MG/DL
CREAT SPEC-SCNC: 273 MG/DL
CREAT SPEC-SCNC: 273 MG/DL
CREAT/PROT UR: 0.6 RATIO
EOSINOPHIL # BLD AUTO: 0.08 K/UL
EOSINOPHIL NFR BLD AUTO: 1.6 %
FOLATE SERPL-MCNC: >20 NG/ML
GLUCOSE QUALITATIVE U: 100 MG/DL
GLUCOSE SERPL-MCNC: 187 MG/DL
HBA1C MFR BLD HPLC: 9.6 %
HCT VFR BLD CALC: 41.2 %
HDLC SERPL-MCNC: 36 MG/DL
HGB BLD-MCNC: 13.6 G/DL
IMM GRANULOCYTES NFR BLD AUTO: 0 %
KETONES URINE: NEGATIVE
LDLC SERPL CALC-MCNC: 120 MG/DL
LEUKOCYTE ESTERASE URINE: NEGATIVE
LYMPHOCYTES # BLD AUTO: 2.07 K/UL
LYMPHOCYTES NFR BLD AUTO: 41.2 %
MAN DIFF?: NORMAL
MCHC RBC-ENTMCNC: 28.8 PG
MCHC RBC-ENTMCNC: 33 GM/DL
MCV RBC AUTO: 87.3 FL
MICROALBUMIN 24H UR DL<=1MG/L-MCNC: 94.1 MG/DL
MICROALBUMIN/CREAT 24H UR-RTO: 345 MG/G
MONOCYTES # BLD AUTO: 0.35 K/UL
MONOCYTES NFR BLD AUTO: 7 %
NEUTROPHILS # BLD AUTO: 2.5 K/UL
NEUTROPHILS NFR BLD AUTO: 49.8 %
NITRITE URINE: NEGATIVE
PH URINE: 5
PLATELET # BLD AUTO: 168 K/UL
POTASSIUM SERPL-SCNC: 4.3 MMOL/L
PROT SERPL-MCNC: 7.2 G/DL
PROT UR-MCNC: 154 MG/DL
PROTEIN URINE: 100 MG/DL
PSA SERPL-MCNC: 0.87 NG/ML
RBC # BLD: 4.72 M/UL
RBC # FLD: 14.8 %
SODIUM SERPL-SCNC: 138 MMOL/L
SPECIFIC GRAVITY URINE: 1.03
TRIGL SERPL-MCNC: 276 MG/DL
TSH SERPL-ACNC: 1.57 UIU/ML
UROBILINOGEN URINE: NEGATIVE MG/DL
VIT B12 SERPL-MCNC: 599 PG/ML
WBC # FLD AUTO: 5.02 K/UL

## 2019-03-04 ENCOUNTER — RX RENEWAL (OUTPATIENT)
Age: 59
End: 2019-03-04

## 2019-03-11 ENCOUNTER — APPOINTMENT (OUTPATIENT)
Dept: OPHTHALMOLOGY | Facility: CLINIC | Age: 59
End: 2019-03-11
Payer: MEDICAID

## 2019-03-11 DIAGNOSIS — H26.9 UNSPECIFIED CATARACT: ICD-10-CM

## 2019-03-11 PROCEDURE — 92014 COMPRE OPH EXAM EST PT 1/>: CPT

## 2019-03-21 ENCOUNTER — APPOINTMENT (OUTPATIENT)
Dept: CARDIOLOGY | Facility: HOSPITAL | Age: 59
End: 2019-03-21

## 2019-03-21 VITALS
RESPIRATION RATE: 16 BRPM | DIASTOLIC BLOOD PRESSURE: 88 MMHG | HEART RATE: 50 BPM | HEIGHT: 66 IN | BODY MASS INDEX: 27.32 KG/M2 | WEIGHT: 170 LBS | SYSTOLIC BLOOD PRESSURE: 148 MMHG | OXYGEN SATURATION: 98 %

## 2019-03-22 ENCOUNTER — APPOINTMENT (OUTPATIENT)
Dept: INTERNAL MEDICINE | Facility: CLINIC | Age: 59
End: 2019-03-22
Payer: MEDICAID

## 2019-03-22 VITALS
SYSTOLIC BLOOD PRESSURE: 156 MMHG | HEART RATE: 73 BPM | BODY MASS INDEX: 27.48 KG/M2 | OXYGEN SATURATION: 98 % | HEIGHT: 66 IN | DIASTOLIC BLOOD PRESSURE: 100 MMHG | WEIGHT: 171 LBS

## 2019-03-22 LAB — GLUCOSE BLDC GLUCOMTR-MCNC: 214

## 2019-03-22 PROCEDURE — 82962 GLUCOSE BLOOD TEST: CPT

## 2019-03-22 PROCEDURE — 99214 OFFICE O/P EST MOD 30 MIN: CPT | Mod: 25

## 2019-03-22 NOTE — ASSESSMENT
[FreeTextEntry1] : DM uncontrolled - gli- 214 today POC \par AIC 1/2019 -9.6 \par -educated pt risk of uncontrolled DM , including microvascular and macrovascular complications ,  retinopathy leading to blindness , nephropathy leading to ESRD requiring Dialysis , neuropathy leading to amputations , poor wound healing , CAD- leading to MI and death. \par -pt verbalized and understands ,agreed to start being compliance with medication , diet and exercise \par -he will see DM nutritionist \par - will satrt walking and loose wieight \par - has appt PMD next month \par \par numbness tingling - \par - to see podiatrist r/o diabetic neuropathy \par - pt deferred Gabapentin to next visit \par \par HTN- elevated BP readings confirmed \par --no cp sob palpitation or dizzy spells , no leg swelling \par -restart current medications \par - educated low salt diet , avoid canned , processed and fast food ,chips , bagged items ,  start exercise daily 30- 40 minutes  , loose weight \par -f/u 4 weeks check BP\par \par

## 2019-03-22 NOTE — HISTORY OF PRESENT ILLNESS
[FreeTextEntry8] : c/o tingling numbness in feet B/L rt > left started 3 days \par -no weakness in legs , strength and movement is good \par - only in foot , not on legs \par \par hX DM - admits non compliance with diet and medications did not take for a month , has not been checking sugars \par - last aic 9.6\par - eats rice , sweets , etc bread ,sedentary no exercise

## 2019-04-04 NOTE — PHYSICAL EXAM
[Normal Appearance] : normal appearance [General Appearance - In No Acute Distress] : no acute distress [Normal Conjunctiva] : the conjunctiva exhibited no abnormalities [Normal Oral Mucosa] : normal oral mucosa [FreeTextEntry1] : No JVD [] : no respiratory distress [Auscultation Breath Sounds / Voice Sounds] : lungs were clear to auscultation bilaterally [Heart Sounds] : normal S1 and S2 [Murmurs] : no murmurs present [Edema] : no peripheral edema present [Bowel Sounds] : normal bowel sounds [Abdomen Soft] : soft [Abdomen Tenderness] : non-tender [Nail Clubbing] : no clubbing of the fingernails [Skin Turgor] : normal skin turgor [Affect] : the affect was normal

## 2019-04-04 NOTE — HISTORY OF PRESENT ILLNESS
[FreeTextEntry1] : 57-year-old M with a PMH of CAD S/P 4-vessel CABG (12/2011), mild-moderate LV dysfunction, HTN, dyslipidemia and diabetes following up in cardiology clinic for optimization of his CAD and heart failure regimen. No longer having cough, no fevers. No SOB or chest pain. No LE edema or palp. No orthopnea. No F/C. \par Continues to have poor diet. Compliant with medications. Exercises by walking but has been doing less.

## 2019-04-04 NOTE — REVIEW OF SYSTEMS
[Shortness Of Breath] : no shortness of breath [Dyspnea on exertion] : not dyspnea during exertion [Chest Pain] : no chest pain [Lower Ext Edema] : no extremity edema [Palpitations] : no palpitations [Cough] : cough [Negative] : Psychiatric

## 2019-04-04 NOTE — DISCUSSION/SUMMARY
[FreeTextEntry1] : Mr. Joanne Smyth is a 57 year old gentleman with a CAD status post 4-vessel CABG, HFrEF (ICM, mild-mod LV dysfunction) previously with a LifeVest, diabetes following up in cardiology clinic for optimization of his CAD and heart failure regimen.\par \par \par # Heart Failure -   On an excellent regimen of beta blocker (Coreg), ACE/ARB (losartan, had possible ACE-inhibitor associated cough with lisinopril), diuretic (lasix). Currently compensated. \par -  ASA 81mg PO Qday\par -  Coreg 25mg PO BID\par -  Lipitor 80mg PO Qday\par -  Losartan 100mg PO Qday\par -  Lasix 20mg PO Qday (up to 40mg PRN Weight Gain)\par - Diet and exercise counseling provided during visit\par \par # HTN - Slightly elevated today. On Coreg and Losartan.   \par -  Continue current regimen for now. If continues to be elevated on next visit will consider altering regimen\par \par \par # HYPERLIPIDEMIA \par -continue high dose statin\par -FLP as above, elelvated. Diet counseling provided. If does not improve, will consider adding on fibrate\par \par # Coronary Artery Disease S/P CABG - He will need to continue aspirin daily.  Otherwise, he is to continue his beta blocker and his BP and HR are at optimal limits.\par \par RTC 1 month

## 2019-04-25 ENCOUNTER — APPOINTMENT (OUTPATIENT)
Dept: CARDIOLOGY | Facility: HOSPITAL | Age: 59
End: 2019-04-25

## 2019-04-25 VITALS
RESPIRATION RATE: 18 BRPM | WEIGHT: 178 LBS | BODY MASS INDEX: 28.61 KG/M2 | SYSTOLIC BLOOD PRESSURE: 129 MMHG | OXYGEN SATURATION: 97 % | HEART RATE: 56 BPM | DIASTOLIC BLOOD PRESSURE: 73 MMHG | HEIGHT: 66 IN | TEMPERATURE: 97.3 F

## 2019-05-07 ENCOUNTER — APPOINTMENT (OUTPATIENT)
Dept: INTERNAL MEDICINE | Facility: CLINIC | Age: 59
End: 2019-05-07
Payer: MEDICAID

## 2019-05-07 VITALS
RESPIRATION RATE: 16 BRPM | SYSTOLIC BLOOD PRESSURE: 134 MMHG | TEMPERATURE: 97.7 F | WEIGHT: 168 LBS | HEART RATE: 56 BPM | DIASTOLIC BLOOD PRESSURE: 74 MMHG | OXYGEN SATURATION: 98 % | BODY MASS INDEX: 27 KG/M2 | HEIGHT: 66 IN

## 2019-05-07 DIAGNOSIS — Z86.2 PERSONAL HISTORY OF DISEASES OF THE BLOOD AND BLOOD-FORMING ORGANS AND CERTAIN DISORDERS INVOLVING THE IMMUNE MECHANISM: ICD-10-CM

## 2019-05-07 PROCEDURE — 99214 OFFICE O/P EST MOD 30 MIN: CPT | Mod: 25

## 2019-05-07 PROCEDURE — 36415 COLL VENOUS BLD VENIPUNCTURE: CPT

## 2019-05-07 RX ORDER — ERGOCALCIFEROL 1.25 MG/1
1.25 MG CAPSULE, LIQUID FILLED ORAL
Qty: 8 | Refills: 0 | Status: DISCONTINUED | COMMUNITY
Start: 2019-01-11 | End: 2019-05-07

## 2019-05-07 NOTE — PHYSICAL EXAM
[General Appearance - Alert] : alert [General Appearance - In No Acute Distress] : in no acute distress [Extraocular Movements] : extraocular movements were intact [Sclera] : the sclera and conjunctiva were normal [PERRL With Normal Accommodation] : pupils were equal in size, round, and reactive to light [Outer Ear] : the ears and nose were normal in appearance [Nasal Cavity] : the nasal mucosa and septum were normal [Oropharynx] : the oropharynx was normal [Neck Appearance] : the appearance of the neck was normal [Thyroid Diffuse Enlargement] : the thyroid was not enlarged [Auscultation Breath Sounds / Voice Sounds] : lungs were clear to auscultation bilaterally [Respiration, Rhythm And Depth] : normal respiratory rhythm and effort [Heart Rate And Rhythm] : heart rate was normal and rhythm regular [Full Pulse] : the pedal pulses are present [Murmurs] : no murmurs [Heart Sounds] : normal S1 and S2 [Edema] : there was no peripheral edema [Abdomen Soft] : soft [Abdomen Tenderness] : non-tender [Bowel Sounds] : normal bowel sounds [Cervical Lymph Nodes Enlarged Anterior Bilaterally] : anterior cervical [Cervical Lymph Nodes Enlarged Posterior Bilaterally] : posterior cervical [No Spinal Tenderness] : no spinal tenderness [No CVA Tenderness] : no ~M costovertebral angle tenderness [Supraclavicular Lymph Nodes Enlarged Bilaterally] : supraclavicular [Abnormal Walk] : normal gait [] : no rash [Musculoskeletal - Swelling] : no joint swelling seen [No Focal Deficits] : no focal deficits [Oriented To Time, Place, And Person] : oriented to person, place, and time [Mood] : the mood was normal [Affect] : the affect was normal [General Appearance - Well-Appearing] : healthy appearing [FreeTextEntry1] : scattered freckles

## 2019-05-07 NOTE — ASSESSMENT
[FreeTextEntry1] : \par right hand/foot tingling- onset 3/19 with near resolution on own since.  Unclear etiology, exam unremarkable\par -check labs: cbc/cmp/TSH/B12/folate\par -consider neuro eval if sx's persist\par -advised to f/u if worsens\par \par hx CAD s/p CABG 12/2011, CHF, HTN, HLD, proteinuria- asx\par -1/19 Tchol 211   HDL 36- check repeat, requests results to cardio when done\par -11/17 UA: +prt--> 2/18 prt/crt abnl; 1/19 prt/crt 0.6  Renal eval pending.  US renal pending.\par -6/16 echo: mild to mod LV dysfxn, inferoseptal hypokinesis\par -1/19 bmp wnl\par -followed by cardio- states last seen 4/19, no changes made, has f/u 8/19\par -renal eval pending\par -on ASA/lipitor/coreg/lasix prn/losartan (hx cough 2/2 ACE-I).  Importance of med adherence counseled- adverse outcome with nonadherence (ie MI, death) counseled.\par -followed by optho, seen  3/19- states told nl dilated exam to f/u 1 yr\par -low salt and fat diet advised\par \par DM- 8/18 A1c 9.6 (was 10.1); 5/18 microalbumin +\par -endo referral given prior, pending\par -on metformin, glimepiride and Onglyza- will check on need for PA\par -on ARB\par -home fs monitoring encouraged\par -followed by optho\par -followed by podiatry, seen 2016 told nl.  Referral given.  Good DM foot hygiene counseled\par -ADA diet, exercise advised\par -declines DM educator referral\par -check A1c and microalbumin\par \par hx anemia, B12 def- asx\par -11/17 B12 wnl, Hg 12.7 (stable)--> cbc wnl 1/19\par -11/17 FIT negative, declines repeat\par -on B12 supplement\par -hx screening colonoscopy 8/12: polyps, internal hemorrhoids, rec: repeat in 5 yrs (Dr. Davis)- GI f/u advised and referral given\par \par hx microscopic hematuria- asx\par - referral for eval given prior- pending\par \par overweight- \par -healthy eating, exercise and cont'd wt loss counseled.\par \par \par HCM\par -hx CPE 1/19\par -hx flu shot 1/19\par -hx Tdap 2012\par -hx PVX 2012\par -hx prevnar 9/16\par -hx hep B series\par -hx screening colonoscopy 8/12: polyps, internal hemorrhoids, rec: repeat in 5 yrs (Dr. Davis)- GI f/u advised, hesitant, 11/17 FIT card- negative, declines repeat.\par -derm referral for screening- pending.  Regular use of sun block for skin cancer prevention counseled\par \par Pt's cell: [phone]

## 2019-05-07 NOTE — HISTORY OF PRESENT ILLNESS
[FreeTextEntry1] : \par Feeling well.\par Needs med refills.\par \par \par hx tingling in right hand/foot- seen by another provider in office 3/19 for eval with podiatry eval advised- pending per pt.  Reports sx's ~ 90% better on own since visit.  No meds used, not interested currently.\par -notes "sand paper" like sensation at right hand pinky and 4th digit and sole of right foot- on/off, no relation to movement and occur at different times.  Denies rash, hx trauma, weakness or swelling.\par \par \par hx CAD s/p CABG 12/2011, CHF, HTN, HLD-\par -followed by cardio- states last seen 4/19 no changes made\par -better with ADA/low salt and fat diet in recently and has lost few lbs as result\par -no formal exercise but stays active daily at work, occ walks in neighborhood\par -denies HA, dizziness, CP, sob, leg edema, orthopnea or PND\par -followed by optho, seen 3/19- states told nl dilated exam and f/u 1 yr\par \par DM- reports adherent with meds\par -on metformin \par -on onglyza x 3 yrs, states told by pharmacy may not be covered by insurance\par -on glimepiride, dose increased to 2mg qd 8/18\par -no recent home fs checks; denies hypoglycemic sx's, polyuria or polydipsia. \par -followed by optho, last 3/19\par -followed by podiatry, seen 2016 told nl- f/u pending\par \par Reports nl appetite and BMs.  \par Denies GI complaints.\par \par Denies  complaints.\par

## 2019-05-07 NOTE — REVIEW OF SYSTEMS
[Negative] : Integumentary [see HPI] : see HPI [Fever] : no fever [Chills] : no chills [Feeling Tired] : not feeling tired [Feeling Poorly] : not feeling poorly [Chest Pain] : no chest pain [Lower Ext Edema] : no extremity edema [Palpitations] : no palpitations [Cough] : no cough [SOB on Exertion] : no shortness of breath during exertion [Heartburn] : no heartburn [Abdominal Pain] : no abdominal pain [Melena] : no melena [Dizziness] : no dizziness [Fainting] : no fainting [Limb Weakness] : no limb weakness [Difficulty Walking] : no difficulty walking

## 2019-05-10 LAB
25(OH)D3 SERPL-MCNC: 23.1 NG/ML
ANION GAP SERPL CALC-SCNC: 13 MMOL/L
BASOPHILS # BLD AUTO: 0.04 K/UL
BASOPHILS NFR BLD AUTO: 0.7 %
BUN SERPL-MCNC: 17 MG/DL
CALCIUM SERPL-MCNC: 8.8 MG/DL
CHLORIDE SERPL-SCNC: 107 MMOL/L
CHOLEST SERPL-MCNC: 239 MG/DL
CHOLEST/HDLC SERPL: 6.1 RATIO
CO2 SERPL-SCNC: 22 MMOL/L
CREAT SERPL-MCNC: 0.91 MG/DL
CREAT SPEC-SCNC: 235 MG/DL
EOSINOPHIL # BLD AUTO: 0.12 K/UL
EOSINOPHIL NFR BLD AUTO: 2.1 %
ESTIMATED AVERAGE GLUCOSE: 189 MG/DL
FOLATE SERPL-MCNC: 13.5 NG/ML
GLUCOSE SERPL-MCNC: 203 MG/DL
HBA1C MFR BLD HPLC: 8.2 %
HCT VFR BLD CALC: 41.5 %
HDLC SERPL-MCNC: 39 MG/DL
HGB BLD-MCNC: 13.7 G/DL
IMM GRANULOCYTES NFR BLD AUTO: 0.2 %
LDLC SERPL CALC-MCNC: 141 MG/DL
LYMPHOCYTES # BLD AUTO: 2.01 K/UL
LYMPHOCYTES NFR BLD AUTO: 35.4 %
MAN DIFF?: NORMAL
MCHC RBC-ENTMCNC: 29.5 PG
MCHC RBC-ENTMCNC: 33 GM/DL
MCV RBC AUTO: 89.2 FL
MICROALBUMIN 24H UR DL<=1MG/L-MCNC: 110.5 MG/DL
MICROALBUMIN/CREAT 24H UR-RTO: 471 MG/G
MONOCYTES # BLD AUTO: 0.36 K/UL
MONOCYTES NFR BLD AUTO: 6.3 %
NEUTROPHILS # BLD AUTO: 3.13 K/UL
NEUTROPHILS NFR BLD AUTO: 55.3 %
PLATELET # BLD AUTO: 210 K/UL
POTASSIUM SERPL-SCNC: 5.1 MMOL/L
RBC # BLD: 4.65 M/UL
RBC # FLD: 13.9 %
SODIUM SERPL-SCNC: 142 MMOL/L
TRIGL SERPL-MCNC: 296 MG/DL
TSH SERPL-ACNC: 1.19 UIU/ML
VIT B12 SERPL-MCNC: 814 PG/ML
WBC # FLD AUTO: 5.67 K/UL

## 2019-06-13 NOTE — PHYSICAL EXAM
[Normal Appearance] : normal appearance [General Appearance - In No Acute Distress] : no acute distress [Normal Conjunctiva] : the conjunctiva exhibited no abnormalities [Normal Oral Mucosa] : normal oral mucosa [] : no respiratory distress [Auscultation Breath Sounds / Voice Sounds] : lungs were clear to auscultation bilaterally [Heart Sounds] : normal S1 and S2 [Edema] : no peripheral edema present [Murmurs] : no murmurs present [Bowel Sounds] : normal bowel sounds [Abdomen Soft] : soft [Abdomen Tenderness] : non-tender [Nail Clubbing] : no clubbing of the fingernails [Affect] : the affect was normal [Skin Turgor] : normal skin turgor [FreeTextEntry1] : No JVD

## 2019-06-13 NOTE — DISCUSSION/SUMMARY
[FreeTextEntry1] : Mr. Joanne Smyth is a 57 year old gentleman with a CAD status post 4-vessel CABG, HFrEF (ICM, mild-mod LV dysfunction) previously with a LifeVest, diabetes following up in cardiology clinic for optimization of his CAD and heart failure regimen.\par \par # Heart Failure -   On an excellent regimen of beta blocker (Coreg), ACE/ARB (losartan, had possible ACE-inhibitor associated cough with lisinopril), diuretic (lasix). Currently compensated. \par -  ASA 81mg PO Qday\par -  Coreg 25mg PO BID\par -  Lipitor 80mg PO Qday\par -  Losartan 100mg PO Qday\par -  Lasix 20mg PO Qday (up to 40mg PRN Weight Gain)\par - Diet and exercise counseling provided during visit\par \par # HTN - More controlled today. On Coreg and Losartan.   \par -  Continue current regimen for now\par \par # HYPERLIPIDEMIA \par -continue high dose statin\par -FLP as above, elelvated. Diet counseling provided. If does not improve, will consider adding on fibrate\par \par # Coronary Artery Disease S/P CABG - He will need to continue aspirin daily.  Otherwise, he is to continue his beta blocker and his BP and HR are at optimal limits.\par \par RTC 3 months

## 2019-06-13 NOTE — HISTORY OF PRESENT ILLNESS
[FreeTextEntry1] : 57-year-old M with a PMH of CAD S/P 4-vessel CABG (12/2011), mild-moderate LV dysfunction, HTN, dyslipidemia and diabetes following up in cardiology clinic for optimization of his CAD and heart failure regimen. No longer having cough, no fevers. No SOB or chest pain. No LE edema or palp. No orthopnea. No F/C. \par Continues to have poor diet however trying to improve. Compliant with medications. Exercises by walking but has been doing less.

## 2019-08-01 ENCOUNTER — APPOINTMENT (OUTPATIENT)
Dept: CARDIOLOGY | Facility: HOSPITAL | Age: 59
End: 2019-08-01

## 2019-08-01 VITALS
RESPIRATION RATE: 14 BRPM | SYSTOLIC BLOOD PRESSURE: 144 MMHG | WEIGHT: 170 LBS | HEART RATE: 60 BPM | DIASTOLIC BLOOD PRESSURE: 82 MMHG | HEIGHT: 66 IN | OXYGEN SATURATION: 99 % | BODY MASS INDEX: 27.32 KG/M2

## 2019-08-14 ENCOUNTER — APPOINTMENT (OUTPATIENT)
Dept: INTERNAL MEDICINE | Facility: CLINIC | Age: 59
End: 2019-08-14
Payer: MEDICAID

## 2019-08-14 ENCOUNTER — LABORATORY RESULT (OUTPATIENT)
Age: 59
End: 2019-08-14

## 2019-08-14 VITALS
TEMPERATURE: 97.8 F | WEIGHT: 168 LBS | SYSTOLIC BLOOD PRESSURE: 140 MMHG | RESPIRATION RATE: 14 BRPM | HEART RATE: 59 BPM | HEIGHT: 66 IN | DIASTOLIC BLOOD PRESSURE: 90 MMHG | BODY MASS INDEX: 27 KG/M2 | OXYGEN SATURATION: 98 %

## 2019-08-14 DIAGNOSIS — R20.0 ANESTHESIA OF SKIN: ICD-10-CM

## 2019-08-14 DIAGNOSIS — Z86.39 PERSONAL HISTORY OF OTHER ENDOCRINE, NUTRITIONAL AND METABOLIC DISEASE: ICD-10-CM

## 2019-08-14 DIAGNOSIS — R20.2 ANESTHESIA OF SKIN: ICD-10-CM

## 2019-08-14 PROCEDURE — 99214 OFFICE O/P EST MOD 30 MIN: CPT | Mod: 25

## 2019-08-14 PROCEDURE — 36415 COLL VENOUS BLD VENIPUNCTURE: CPT

## 2019-08-14 RX ORDER — FLUTICASONE PROPIONATE 50 UG/1
50 SPRAY, METERED NASAL DAILY
Qty: 16 | Refills: 0 | Status: DISCONTINUED | COMMUNITY
Start: 2018-05-22 | End: 2019-08-14

## 2019-08-14 NOTE — ASSESSMENT
[FreeTextEntry1] : \par hx right hand/foot tingling- resolved on owon, hx onset 3/19 with near resolution on own since.  Unclear etiology, exam unremarkable\par -5/19 cbc/bmp/TSH/B12/folate wnl\par -advised to f/u if recurs\par \par CAD s/p CABG 12/2011, CHF, HTN, HLD, proteinuria- asx\par -1/19 Tchol 211   HDL 36- check repeat, requests results to cardio when done\par -11/17 UA: +prt--> 2/18 prt/crt abnl; 1/19 prt/crt 0.6  Renal eval pending.  US renal pending.\par -6/16 echo: mild to mod LV dysfxn, inferoseptal hypokinesis\par -5/19 bmp wnl, check repeat\par -followed by cardio- states last seen 8/19, no changes made, has f/u 11/19\par -renal eval pending\par -on ASA/lipitor/coreg/lasix prn/losartan (hx cough 2/2 ACE-I).  Importance of med adherence counseled- adverse outcome with nonadherence (ie MI, death) counseled.\par -followed by optho, seen 3/19- states told nl dilated exam to f/u 1 yr\par -low salt and fat diet advised\par \par DM- 5/19 A1c 8.2 (was 9.6); microalbumin 475 (was 375)\par -endo referral given prior, pending\par -on metformin, glimepiride and Onglyza- will f/u if needs PA\par -on ARB\par -home fs monitoring encouraged\par -followed by optho\par -followed by podiatry, seen 2016 told nl.  Referral given.  Good DM foot hygiene counseled\par -ADA diet, exercise and wt loss counseled\par -declines DM educator referral\par -check A1c and microalbumin\par \par hx anemia, B12 def- asx\par -11/17 B12 wnl, Hg 12.7 (stable)--> cbc wnl 1/19\par -11/17 FIT negative, declines repeat\par -5/18 cbc/B12/folate wnl\par -on B12 supplement\par -hx screening colonoscopy 8/12: polyps, internal hemorrhoids, rec: repeat in 5 yrs (Dr. Davis)- GI f/u advised and referral given\par \par hx microscopic hematuria- asx\par -1/17 UA +rbc\par -1/19 UA prt 100, no rbc\par -check repeat UA \par - referral for eval given prior- pending\par \par overweight- \par -healthy eating, exercise and wt loss counseled.\par \par \par HCM\par -hx CPE 1/19\par -hx flu shot 1/19\par -hx Tdap 2012\par -hx PVX 2012\par -hx prevnar 9/16\par -hx hep B series\par -hx screening colonoscopy 8/12: polyps, internal hemorrhoids, rec: repeat in 5 yrs (Dr. Davis)- GI f/u advised, hesitant, 11/17 FIT card- negative, declines repeat.\par -derm referral for screening- pending.  Regular use of sun block for skin cancer prevention counseled\par \par Pt's cell: [phone]

## 2019-08-14 NOTE — PHYSICAL EXAM
[General Appearance - Alert] : alert [General Appearance - In No Acute Distress] : in no acute distress [General Appearance - Well-Appearing] : healthy appearing [Sclera] : the sclera and conjunctiva were normal [PERRL With Normal Accommodation] : pupils were equal in size, round, and reactive to light [Extraocular Movements] : extraocular movements were intact [Outer Ear] : the ears and nose were normal in appearance [Nasal Cavity] : the nasal mucosa and septum were normal [Oropharynx] : the oropharynx was normal [Neck Appearance] : the appearance of the neck was normal [Thyroid Diffuse Enlargement] : the thyroid was not enlarged [Respiration, Rhythm And Depth] : normal respiratory rhythm and effort [Auscultation Breath Sounds / Voice Sounds] : lungs were clear to auscultation bilaterally [Heart Rate And Rhythm] : heart rate was normal and rhythm regular [Heart Sounds] : normal S1 and S2 [Murmurs] : no murmurs [Full Pulse] : the pedal pulses are present [Bowel Sounds] : normal bowel sounds [Edema] : there was no peripheral edema [Abdomen Soft] : soft [Abdomen Tenderness] : non-tender [Cervical Lymph Nodes Enlarged Posterior Bilaterally] : posterior cervical [Cervical Lymph Nodes Enlarged Anterior Bilaterally] : anterior cervical [Supraclavicular Lymph Nodes Enlarged Bilaterally] : supraclavicular [No CVA Tenderness] : no ~M costovertebral angle tenderness [No Spinal Tenderness] : no spinal tenderness [Musculoskeletal - Swelling] : no joint swelling seen [Abnormal Walk] : normal gait [] : no rash [No Focal Deficits] : no focal deficits [Oriented To Time, Place, And Person] : oriented to person, place, and time [Affect] : the affect was normal [Mood] : the mood was normal [FreeTextEntry1] : scattered freckles

## 2019-08-14 NOTE — HISTORY OF PRESENT ILLNESS
[FreeTextEntry1] : \par Feeling well.\par Fasting for labs.\par Needs med refills.\par \par \par hx tingling in right hand/foot- seen by another provider in office 3/19 for eval with podiatry eval advised- pending per pt.  Reports sx's now 100% better on own since visit.  No meds used, not interested currently.\par -notes hx "sand paper" like sensation at right hand pinky and 4th digit and sole of right foot- on/off, no relation to movement and occur at different times.  Denies rash, hx trauma, weakness or swelling.\par \par \par hx CAD s/p CABG 12/2011, CHF, HTN, HLD-\par -followed by cardio- states last seen 8/19 no changes made, asked to get lipids checked at PMD visit\par -better with ADA/low salt and fat diet in recently and has lost few lbs as result\par -no formal exercise but stays active daily at work, occ walks in neighborhood\par -denies HA, dizziness, CP, sob, leg edema, orthopnea or PND\par -followed by optho, seen 3/19- states told nl dilated exam and f/u 1 yr\par \par DM- reports adherent with all meds, admits eating sweets often\par -on metformin \par -on onglyza x 3 yrs, states told prior by pharmacy may not be covered by insurance- not sure of status but has been taking regularly as not run out from when told this issue.\par -on glimepiride, dose increased to 2mg qd 8/18\par -occ home fs checks (AM) 150-300, no postprandial checks; denies hypoglycemic sx's, polyuria or polydipsia. \par -followed by optho, last 3/19\par -followed by podiatry, seen 2016 told nl- f/u pending\par \par Reports nl appetite and BMs.  \par Denies GI complaints.\par \par Denies  complaints.\par

## 2019-08-14 NOTE — REVIEW OF SYSTEMS
[see HPI] : see HPI [Negative] : Neurological [Fever] : no fever [Chills] : no chills [Feeling Tired] : not feeling tired [Feeling Poorly] : not feeling poorly [Chest Pain] : no chest pain [Palpitations] : no palpitations [Lower Ext Edema] : no extremity edema [Cough] : no cough [SOB on Exertion] : no shortness of breath during exertion [Abdominal Pain] : no abdominal pain [Heartburn] : no heartburn [Melena] : no melena [Dizziness] : no dizziness [Fainting] : no fainting [Limb Weakness] : no limb weakness [Difficulty Walking] : no difficulty walking

## 2019-08-23 LAB
ANION GAP SERPL CALC-SCNC: 11 MMOL/L
APPEARANCE: CLEAR
BILIRUBIN URINE: NEGATIVE
BLOOD URINE: NEGATIVE
BUN SERPL-MCNC: 15 MG/DL
CALCIUM SERPL-MCNC: 8.9 MG/DL
CHLORIDE SERPL-SCNC: 102 MMOL/L
CHOLEST SERPL-MCNC: 313 MG/DL
CHOLEST/HDLC SERPL: 10.1 RATIO
CO2 SERPL-SCNC: 20 MMOL/L
COLOR: YELLOW
CREAT SERPL-MCNC: 0.78 MG/DL
CREAT SPEC-SCNC: 257 MG/DL
ESTIMATED AVERAGE GLUCOSE: 280 MG/DL
GLUCOSE QUALITATIVE U: ABNORMAL
GLUCOSE SERPL-MCNC: 252 MG/DL
HBA1C MFR BLD HPLC: 11.4 %
HDLC SERPL-MCNC: 31 MG/DL
KETONES URINE: NEGATIVE
LDLC SERPL CALC-MCNC: NORMAL MG/DL
LEUKOCYTE ESTERASE URINE: NEGATIVE
MICROALBUMIN 24H UR DL<=1MG/L-MCNC: 252.1 MG/DL
MICROALBUMIN/CREAT 24H UR-RTO: 980 MG/G
NITRITE URINE: NEGATIVE
PH URINE: 6
POTASSIUM SERPL-SCNC: 4.2 MMOL/L
PROTEIN URINE: ABNORMAL
SODIUM SERPL-SCNC: 133 MMOL/L
SPECIFIC GRAVITY URINE: 1.03
TRIGL SERPL-MCNC: 732 MG/DL
UROBILINOGEN URINE: ABNORMAL

## 2019-08-27 NOTE — DISCUSSION/SUMMARY
[FreeTextEntry1] : Mr. Joanne Smyth is a 57 year old gentleman with a CAD status post 4-vessel CABG, HFrEF (ICM, mild-mod LV dysfunction) previously with a LifeVest, diabetes following up in cardiology clinic for optimization of his CAD and heart failure regimen.\par \par # Heart Failure -   On an excellent regimen of beta blocker (Coreg), ACE/ARB (losartan, had possible ACE-inhibitor associated cough with lisinopril), diuretic (lasix). Currently compensated. \par -  ASA 81mg PO Qday\par -  Coreg 25mg PO BID\par -  Lipitor 80mg PO Qday\par -  Losartan 100mg PO Qday\par -  Lasix 20mg PO Qday (up to 40mg PRN Weight Gain)\par - Diet and exercise counseling provided during visit\par \par # HTN - More controlled today. On Coreg and Losartan.   \par -  Continue current regimen for now\par \par # HYPERLIPIDEMIA \par -continue high dose statin\par -check FLP with PCP. Diet counseling provided. If does not improve, will consider adding on fibrate\par \par # Coronary Artery Disease S/P CABG - He will need to continue aspirin daily.  Otherwise, he is to continue his beta blocker and his BP and HR are at optimal limits.\par \par RTC 3 months

## 2019-08-27 NOTE — HISTORY OF PRESENT ILLNESS
[FreeTextEntry1] : 57-year-old M with a PMH of CAD S/P 4-vessel CABG (12/2011), mild-moderate LV dysfunction, HTN, dyslipidemia and diabetes following up in cardiology clinic for optimization of his CAD and heart failure regimen. No longer having cough, no fevers. No SOB or chest pain. No LE edema or palp. No orthopnea. No F/C. \par Continues to have poor diet however trying to improve. Compliant with medications. Exercises by walking, has started walking more however reports it is difficult due to high temperatures outside.

## 2019-08-27 NOTE — REVIEW OF SYSTEMS
[Cough] : cough [Negative] : Neurological [Shortness Of Breath] : no shortness of breath [Dyspnea on exertion] : not dyspnea during exertion [Lower Ext Edema] : no extremity edema [Chest Pain] : no chest pain [Palpitations] : no palpitations

## 2019-08-27 NOTE — PHYSICAL EXAM
[General Appearance - In No Acute Distress] : no acute distress [Normal Appearance] : normal appearance [Normal Conjunctiva] : the conjunctiva exhibited no abnormalities [] : no respiratory distress [Normal Oral Mucosa] : normal oral mucosa [Heart Sounds] : normal S1 and S2 [Auscultation Breath Sounds / Voice Sounds] : lungs were clear to auscultation bilaterally [Murmurs] : no murmurs present [Bowel Sounds] : normal bowel sounds [Edema] : no peripheral edema present [Nail Clubbing] : no clubbing of the fingernails [Abdomen Tenderness] : non-tender [Abdomen Soft] : soft [Affect] : the affect was normal [Skin Turgor] : normal skin turgor [FreeTextEntry1] : No JVD

## 2019-11-14 ENCOUNTER — APPOINTMENT (OUTPATIENT)
Dept: INTERNAL MEDICINE | Facility: CLINIC | Age: 59
End: 2019-11-14

## 2019-11-14 ENCOUNTER — APPOINTMENT (OUTPATIENT)
Dept: CARDIOLOGY | Facility: HOSPITAL | Age: 59
End: 2019-11-14

## 2019-11-14 VITALS
HEIGHT: 66 IN | SYSTOLIC BLOOD PRESSURE: 166 MMHG | DIASTOLIC BLOOD PRESSURE: 78 MMHG | HEART RATE: 78 BPM | BODY MASS INDEX: 27.16 KG/M2 | OXYGEN SATURATION: 98 % | WEIGHT: 169 LBS

## 2019-11-14 VITALS — SYSTOLIC BLOOD PRESSURE: 148 MMHG | DIASTOLIC BLOOD PRESSURE: 80 MMHG

## 2019-11-14 NOTE — PHYSICAL EXAM
[Normal Appearance] : normal appearance [General Appearance - In No Acute Distress] : no acute distress [Normal Conjunctiva] : the conjunctiva exhibited no abnormalities [Normal Oral Mucosa] : normal oral mucosa [] : no respiratory distress [Auscultation Breath Sounds / Voice Sounds] : lungs were clear to auscultation bilaterally [Heart Sounds] : normal S1 and S2 [Edema] : no peripheral edema present [Murmurs] : no murmurs present [Abdomen Tenderness] : non-tender [Abdomen Soft] : soft [Bowel Sounds] : normal bowel sounds [Affect] : the affect was normal [Skin Turgor] : normal skin turgor [Nail Clubbing] : no clubbing of the fingernails [FreeTextEntry1] : No JVD

## 2019-11-14 NOTE — DISCUSSION/SUMMARY
[FreeTextEntry1] : Mr. Joanne Smyth is a 57 year old gentleman with a CAD status post 4-vessel CABG, HFrEF (ICM, mild-mod LV dysfunction) previously with a LifeVest, diabetes following up in cardiology clinic for optimization of his CAD and heart failure regimen.\par \par # Heart Failure -   On an excellent regimen of beta blocker (Coreg), ACE/ARB (losartan, had possible ACE-inhibitor associated cough with lisinopril), diuretic (lasix). Currently compensated. \par -  ASA 81mg PO Qday\par -  Coreg 25mg PO BID\par -  Lipitor 80mg PO Qday\par -  Losartan 100mg PO Qday\par -  Lasix 20mg PO Qday (up to 40mg PRN Weight Gain)\par - Diet and exercise counseling provided during visit\par \par # HTN - Did not take meds today.  On Coreg and Losartan.   \par -  Continue current regimen for now\par \par # HYPERLIPIDEMIA \par -continue high dose statin\par -repeat FLP as most recent one was abnormally high, if still high then will have to adjust diet and medication regimen\par \par # Coronary Artery Disease S/P CABG - He will need to continue aspirin daily.  Otherwise, he is to continue his beta blocker and his BP and HR are at optimal limits.\par \par RTC 3 months

## 2019-11-14 NOTE — HISTORY OF PRESENT ILLNESS
[FreeTextEntry1] : 59-year-old M with a PMH of CAD S/P 4-vessel CABG (12/2011), mild-moderate LV dysfunction, HTN, dyslipidemia and diabetes following up in cardiology clinic for optimization of his CAD and heart failure regimen. \par No SOB or chest pain. No LE edema or palp. No orthopnea. No F/C. Has been walking and eats sweets less than once per week.

## 2019-11-14 NOTE — REVIEW OF SYSTEMS
[Cough] : cough [Negative] : Psychiatric [Shortness Of Breath] : no shortness of breath [Lower Ext Edema] : no extremity edema [Dyspnea on exertion] : not dyspnea during exertion [Chest Pain] : no chest pain [Palpitations] : no palpitations

## 2019-12-01 ENCOUNTER — OUTPATIENT (OUTPATIENT)
Dept: OUTPATIENT SERVICES | Facility: HOSPITAL | Age: 59
LOS: 1 days | End: 2019-12-01
Payer: MEDICAID

## 2019-12-01 PROCEDURE — G9001: CPT

## 2019-12-04 ENCOUNTER — EMERGENCY (EMERGENCY)
Facility: HOSPITAL | Age: 59
LOS: 1 days | Discharge: HOME | End: 2019-12-04
Attending: STUDENT IN AN ORGANIZED HEALTH CARE EDUCATION/TRAINING PROGRAM | Admitting: STUDENT IN AN ORGANIZED HEALTH CARE EDUCATION/TRAINING PROGRAM
Payer: MEDICAID

## 2019-12-04 VITALS
TEMPERATURE: 98 F | HEART RATE: 65 BPM | DIASTOLIC BLOOD PRESSURE: 84 MMHG | OXYGEN SATURATION: 100 % | SYSTOLIC BLOOD PRESSURE: 161 MMHG | RESPIRATION RATE: 16 BRPM

## 2019-12-04 VITALS
SYSTOLIC BLOOD PRESSURE: 121 MMHG | OXYGEN SATURATION: 98 % | HEART RATE: 53 BPM | TEMPERATURE: 98 F | RESPIRATION RATE: 18 BRPM | DIASTOLIC BLOOD PRESSURE: 67 MMHG

## 2019-12-04 LAB
ALBUMIN SERPL ELPH-MCNC: 3.6 G/DL — SIGNIFICANT CHANGE UP (ref 3.3–5)
ALP SERPL-CCNC: 97 U/L — SIGNIFICANT CHANGE UP (ref 40–120)
ALT FLD-CCNC: 32 U/L — SIGNIFICANT CHANGE UP (ref 4–41)
ANION GAP SERPL CALC-SCNC: 11 MMO/L — SIGNIFICANT CHANGE UP (ref 7–14)
AST SERPL-CCNC: 23 U/L — SIGNIFICANT CHANGE UP (ref 4–40)
BASOPHILS # BLD AUTO: 0.03 K/UL — SIGNIFICANT CHANGE UP (ref 0–0.2)
BASOPHILS NFR BLD AUTO: 0.5 % — SIGNIFICANT CHANGE UP (ref 0–2)
BILIRUB SERPL-MCNC: 0.4 MG/DL — SIGNIFICANT CHANGE UP (ref 0.2–1.2)
BUN SERPL-MCNC: 18 MG/DL — SIGNIFICANT CHANGE UP (ref 7–23)
CALCIUM SERPL-MCNC: 9.3 MG/DL — SIGNIFICANT CHANGE UP (ref 8.4–10.5)
CHLORIDE SERPL-SCNC: 102 MMOL/L — SIGNIFICANT CHANGE UP (ref 98–107)
CO2 SERPL-SCNC: 22 MMOL/L — SIGNIFICANT CHANGE UP (ref 22–31)
CREAT SERPL-MCNC: 0.89 MG/DL — SIGNIFICANT CHANGE UP (ref 0.5–1.3)
EOSINOPHIL # BLD AUTO: 0.11 K/UL — SIGNIFICANT CHANGE UP (ref 0–0.5)
EOSINOPHIL NFR BLD AUTO: 1.7 % — SIGNIFICANT CHANGE UP (ref 0–6)
GLUCOSE SERPL-MCNC: 270 MG/DL — HIGH (ref 70–99)
HCT VFR BLD CALC: 41.6 % — SIGNIFICANT CHANGE UP (ref 39–50)
HGB BLD-MCNC: 14.6 G/DL — SIGNIFICANT CHANGE UP (ref 13–17)
IMM GRANULOCYTES NFR BLD AUTO: 0.3 % — SIGNIFICANT CHANGE UP (ref 0–1.5)
LYMPHOCYTES # BLD AUTO: 2.01 K/UL — SIGNIFICANT CHANGE UP (ref 1–3.3)
LYMPHOCYTES # BLD AUTO: 30.9 % — SIGNIFICANT CHANGE UP (ref 13–44)
MCHC RBC-ENTMCNC: 29.7 PG — SIGNIFICANT CHANGE UP (ref 27–34)
MCHC RBC-ENTMCNC: 35.1 % — SIGNIFICANT CHANGE UP (ref 32–36)
MCV RBC AUTO: 84.6 FL — SIGNIFICANT CHANGE UP (ref 80–100)
MONOCYTES # BLD AUTO: 0.46 K/UL — SIGNIFICANT CHANGE UP (ref 0–0.9)
MONOCYTES NFR BLD AUTO: 7.1 % — SIGNIFICANT CHANGE UP (ref 2–14)
NEUTROPHILS # BLD AUTO: 3.88 K/UL — SIGNIFICANT CHANGE UP (ref 1.8–7.4)
NEUTROPHILS NFR BLD AUTO: 59.5 % — SIGNIFICANT CHANGE UP (ref 43–77)
NRBC # FLD: 0 K/UL — SIGNIFICANT CHANGE UP (ref 0–0)
PLATELET # BLD AUTO: 200 K/UL — SIGNIFICANT CHANGE UP (ref 150–400)
PMV BLD: 11.8 FL — SIGNIFICANT CHANGE UP (ref 7–13)
POTASSIUM SERPL-MCNC: 4.1 MMOL/L — SIGNIFICANT CHANGE UP (ref 3.5–5.3)
POTASSIUM SERPL-SCNC: 4.1 MMOL/L — SIGNIFICANT CHANGE UP (ref 3.5–5.3)
PROT SERPL-MCNC: 7.6 G/DL — SIGNIFICANT CHANGE UP (ref 6–8.3)
RBC # BLD: 4.92 M/UL — SIGNIFICANT CHANGE UP (ref 4.2–5.8)
RBC # FLD: 13.1 % — SIGNIFICANT CHANGE UP (ref 10.3–14.5)
SODIUM SERPL-SCNC: 135 MMOL/L — SIGNIFICANT CHANGE UP (ref 135–145)
WBC # BLD: 6.51 K/UL — SIGNIFICANT CHANGE UP (ref 3.8–10.5)
WBC # FLD AUTO: 6.51 K/UL — SIGNIFICANT CHANGE UP (ref 3.8–10.5)

## 2019-12-04 PROCEDURE — 99284 EMERGENCY DEPT VISIT MOD MDM: CPT

## 2019-12-04 PROCEDURE — 70450 CT HEAD/BRAIN W/O DYE: CPT | Mod: 26

## 2019-12-04 RX ORDER — VALACYCLOVIR 500 MG/1
1000 TABLET, FILM COATED ORAL ONCE
Refills: 0 | Status: COMPLETED | OUTPATIENT
Start: 2019-12-04 | End: 2019-12-04

## 2019-12-04 RX ORDER — VALACYCLOVIR 500 MG/1
1 TABLET, FILM COATED ORAL
Qty: 30 | Refills: 0
Start: 2019-12-04 | End: 2019-12-13

## 2019-12-04 RX ADMIN — Medication 60 MILLIGRAM(S): at 12:31

## 2019-12-04 RX ADMIN — VALACYCLOVIR 1000 MILLIGRAM(S): 500 TABLET, FILM COATED ORAL at 12:31

## 2019-12-04 NOTE — ED PROVIDER NOTE - CLINICAL SUMMARY MEDICAL DECISION MAKING FREE TEXT BOX
59yM w/pmhx DM, HTN, HLD, CAD s/p CABG p/w right sided facial droop x this morning. Exam consistent with [place] Palsy given right sided motor findings of upper and lower nerve distribution on exam. Will check CT head, cbc/cmp. Will treat with steroids and valtrex. Blake Cole DO: 59y M pmh DM, HTN, HLD, CAD s/p CABG presents for right sided facial droop and difficulty speaking this morning. code stroke called by triage/FIT doc. Exam consistent with [place] Palsy given right sided motor findings of upper and lower face nerve distribution on exam. neurology evaluated and feel bells palsy as well. plan: f/u labs, ctb, steroids and valtrex. extensive discussion on eye care had at bedside.

## 2019-12-04 NOTE — ED PROVIDER NOTE - OBJECTIVE STATEMENT
59yM w/pmhx DM, HTN, CAD s/p CABG presenting with right sided facial droop x this morning. Pt states for the past 2 days he has had mild right sided headache which worsened last night. At 6:30 this morning he noticed slurred speech and states his niece told him his face looked different. Pt reports intermittent sharp pains behind the right ear with ringing in the ears last night that has since resolved. Pt denies difficulty swallowing, visual changes, hearing changes, numbness/tingling, weakness, dizziness, difficulty walking, chest pain, shortness of breath, abdominal pain, fever/chills, recent travel or illness or any other concerns.

## 2019-12-04 NOTE — ED PROVIDER NOTE - CRITICAL CARE INDICATION, MLM
Name band; patient was critically ill... Patient was critically ill with a high probability of imminent or life threatening deterioration.

## 2019-12-04 NOTE — ED PROVIDER NOTE - PHYSICAL EXAMINATION
Neuro: A&Ox3, PERRL, asymmetric facial expressions, right sided facial droop, pt is unable to lift brow on right side, unable to fully close right eye, asymmetric smile, EOMs intact, central and peripheral vision intact, sensation intact and equal b/l, strength 5/5 in all extremities, normal rapid alternating movements

## 2019-12-04 NOTE — CONSULT NOTE ADULT - SUBJECTIVE AND OBJECTIVE BOX
Neurology  Consult Note  12-04-19    Name:  EVERTON MOSQUERA; 59y (1960)    Reason for Admission:     Chief Complaint:  R. facial weakness     HPI:  58yo [place] man with a PMHx of HTN, HLD, DM, CAD s/p stenting, presents with complaints of R. facial weakness. Patient states that he went to bed normal. Woke normal. LKN 6am. At 630am patient was noted to be slurring speech. At that time, wife noted facial droop on right. EMS activated at this time. Patient states that he has never had this in the past. Denies prior strokes. Of note, wife states that patient has been experiencing cold like symptoms for the past 30days.   Patient currently denies fevers, chills, ns, cp, sob, abd pain, n/v/d/c, other weakness, or changes to weight or senses.   In the ED, VSS, labs show hyperglycemia 292, CTH shows NAD.    NIHSS 4  MRS 0    Review of Systems:  As states in HPI.    PMHx:   Diabetes Mellitus  GERD (Gastroesophageal Reflux Disease)    PFHx:     PSuHx:   Upper Arm Fracture    Medications:  MEDICATIONS  (STANDING):    MEDICATIONS  (PRN):    Vitals:  T(C): 36.4 (12-04-19 @ 09:43), Max: 36.4 (12-04-19 @ 09:43)  HR: 53 (12-04-19 @ 09:43) (53 - 53)  BP: 121/67 (12-04-19 @ 09:43) (121/67 - 121/67)  RR: 18 (12-04-19 @ 09:43) (18 - 18)  SpO2: 98% (12-04-19 @ 09:43) (98% - 98%)    CAPILLARY BLOOD GLUCOSE  POCT Blood Glucose.: 292 mg/dL (04 Dec 2019 09:48)      PHYSICAL EXAMINATION:  General: Well-developed, well nourished, in no acute distress.  Eyes: Conjunctiva and sclera clear.  Neurologic:  - Mental Status:  Alert, awake, oriented to person, place, and time; Speech is fluent with intact naming, repetition, and comprehension; Good overall fund of knowledge;   - Cranial Nerves II-XII:    II:  Visual fields are full to confrontation; Pupils are equal, round, and reactive to light;   III, IV, VI:  Extraocular movements are intact without nystagmus.  V:  Facial sensation is intact in the V1-V3 distribution bilaterally.  VII: R. facial weakness involving forehead, orbicularis oculi, and lower face, LMN distribution. R. eye w/o full closure with full effort.   XI:  Head turning and shoulder shrug are intact.  XII:  Tongue protudes in the midline.  - Motor:  Strength is 5/5 throughout.  There is no pronator drift.  Normal muscle bulk and tone throughout.  - Sensory:  Intact throughout to light touch, pin prick.  - Coordination:  Finger-nose-finger and heel-knee-shin intact without dysmetria.  Rapid alternating hand movements intact.    Radiology:  CT NAD per verbal read.

## 2019-12-04 NOTE — ED ADULT NURSE NOTE - NSIMPLEMENTINTERV_GEN_ALL_ED
Implemented All Universal Safety Interventions:  [place] to call system. Call bell, personal items and telephone within reach. Instruct patient to call for assistance. Room bathroom lighting operational. Non-slip footwear when patient is off stretcher. Physically safe environment: no spills, clutter or unnecessary equipment. Stretcher in lowest position, wheels locked, appropriate side rails in place.

## 2019-12-04 NOTE — CONSULT NOTE ADULT - ASSESSMENT
Impression:    Likely Bell's phenomenon Grade IV-V    Plan:  - CTA H/N to r/o arterial pathology.   - Steroid and Antiviral therapy.   - Eye patch, artificial tears, and education for corneal abrasion risk reduction.   - Outpatient PT     Follow-up  [] f/u outpatient within 2 week of discharge.    - [facility] [place] @ [address] [place] [address], [address] [place]; [phone]

## 2019-12-04 NOTE — ED ADULT NURSE NOTE - OBJECTIVE STATEMENT
patient alert ox3 came in c/o head ache started since yesterday and this morning noticed a facial droop on right side of the face. no other weakness noted. ambulatory. denies chest pain, SOB. c/o pain to right ear and right side of the face feels funny since 630am. seen by Neurology. NAD. connected to CM shows NSR. labs done as ordered. report given to primary RN.

## 2019-12-04 NOTE — ED ADULT TRIAGE NOTE - CHIEF COMPLAINT QUOTE
had headache last night  with high b/p went to sleep woke up normal started  with facial droop @ 630 am spoke with FIT Call A STRokE CoDE

## 2019-12-04 NOTE — ED PROVIDER NOTE - ATTENDING CONTRIBUTION TO CARE
I performed a history and physical exam of the patient and discussed their management with the PA. I reviewed the PA's note and agree with the documented findings and plan of care. I have edited as appropriate. My medical decision making and observations are found above.

## 2019-12-04 NOTE — ED PROVIDER NOTE - PROGRESS NOTE DETAILS
OK Cannon: Code stroke initially called in triage, pt seen by neuro at CT scan, likely bells palsy given both upper and lower facial involvement. OK Cannon: Labs reviewed, will d/ pt with neuro follow up, steroids and valtrex. Discussed ursing eye patch and risk for corneal abrasion. Pt provided artifical tears and erythromycin ointment

## 2019-12-04 NOTE — ED PROVIDER NOTE - PATIENT PORTAL LINK FT
You can access the FollowMyHealth Patient Portal offered by [facility] by registering at the following website: [email or web address]. By joining [email or web address] FollowMyHealth portal, you will also be able to view your health information using other applications (apps) compatible with our system.

## 2019-12-05 DIAGNOSIS — Z71.89 OTHER SPECIFIED COUNSELING: ICD-10-CM

## 2019-12-11 ENCOUNTER — APPOINTMENT (OUTPATIENT)
Dept: INTERNAL MEDICINE | Facility: CLINIC | Age: 59
End: 2019-12-11
Payer: MEDICAID

## 2019-12-11 VITALS
BODY MASS INDEX: 27 KG/M2 | OXYGEN SATURATION: 98 % | HEART RATE: 71 BPM | HEIGHT: 66 IN | TEMPERATURE: 97.5 F | WEIGHT: 168 LBS | SYSTOLIC BLOOD PRESSURE: 154 MMHG | DIASTOLIC BLOOD PRESSURE: 70 MMHG

## 2019-12-11 PROCEDURE — 99214 OFFICE O/P EST MOD 30 MIN: CPT

## 2019-12-11 NOTE — ASSESSMENT
[FreeTextEntry1] : 1) HTN \par - ct meds \par - low salt diet \par - f/u in 3 week\par \par 2) HL\par - restart statin\par - low fat diet \par - f/u in 3 month\par \par 3) DM \par - ct meds \par - discussed risks of uncontrolled DM \par - check A1C in 2 month \par \par 4) Alfred Palsy\par - supportive tx \par - tape eye shut at night

## 2019-12-11 NOTE — HISTORY OF PRESENT ILLNESS
[FreeTextEntry1] : f/u DM / HTN/ HL /CAD \par \par pt had been off of meds until 11/19 \par the A1C was 11.4\par TG was [id number] \par his BP has alos been runing high\par \par earlier this week, he developed R sided Mission Palsy , started on prednisone and Valtrex \par the pt says that he has restarted the ch meds again \par \par  no chest pain / SOB \par \par  no  / GI issues

## 2019-12-11 NOTE — PHYSICAL EXAM
[de-identified] : R side facial weakness  [Normal] : soft, non-tender, non-distended, no masses palpated, no HSM and normal bowel sounds

## 2020-01-08 ENCOUNTER — APPOINTMENT (OUTPATIENT)
Dept: INTERNAL MEDICINE | Facility: CLINIC | Age: 60
End: 2020-01-08
Payer: MEDICAID

## 2020-01-08 VITALS
OXYGEN SATURATION: 98 % | RESPIRATION RATE: 16 BRPM | BODY MASS INDEX: 26.03 KG/M2 | HEART RATE: 82 BPM | SYSTOLIC BLOOD PRESSURE: 134 MMHG | HEIGHT: 66 IN | DIASTOLIC BLOOD PRESSURE: 70 MMHG | TEMPERATURE: 98.3 F | WEIGHT: 162 LBS

## 2020-01-08 DIAGNOSIS — R31.29 OTHER MICROSCOPIC HEMATURIA: ICD-10-CM

## 2020-01-08 PROCEDURE — 36415 COLL VENOUS BLD VENIPUNCTURE: CPT

## 2020-01-08 PROCEDURE — 99214 OFFICE O/P EST MOD 30 MIN: CPT | Mod: 25

## 2020-01-08 PROCEDURE — G0008: CPT

## 2020-01-08 PROCEDURE — 90686 IIV4 VACC NO PRSV 0.5 ML IM: CPT

## 2020-01-10 LAB
ALBUMIN SERPL ELPH-MCNC: 4.4 G/DL
ALP BLD-CCNC: 90 U/L
ALT SERPL-CCNC: 19 U/L
ANION GAP SERPL CALC-SCNC: 14 MMOL/L
AST SERPL-CCNC: 17 U/L
BILIRUB SERPL-MCNC: 0.5 MG/DL
BUN SERPL-MCNC: 13 MG/DL
CALCIUM SERPL-MCNC: 9.5 MG/DL
CHLORIDE SERPL-SCNC: 104 MMOL/L
CHOLEST SERPL-MCNC: 161 MG/DL
CHOLEST/HDLC SERPL: 4 RATIO
CO2 SERPL-SCNC: 24 MMOL/L
CREAT SERPL-MCNC: 1.07 MG/DL
CREAT SPEC-SCNC: 38 MG/DL
CREAT SPEC-SCNC: 38 MG/DL
CREAT/PROT UR: 0.9 RATIO
ESTIMATED AVERAGE GLUCOSE: 186 MG/DL
GLUCOSE SERPL-MCNC: 91 MG/DL
HBA1C MFR BLD HPLC: 8.1 %
HDLC SERPL-MCNC: 40 MG/DL
LDLC SERPL CALC-MCNC: 83 MG/DL
MICROALBUMIN 24H UR DL<=1MG/L-MCNC: 16.9 MG/DL
MICROALBUMIN/CREAT 24H UR-RTO: 444 MG/G
POTASSIUM SERPL-SCNC: 4 MMOL/L
PROT SERPL-MCNC: 7.7 G/DL
PROT UR-MCNC: 34 MG/DL
PSA SERPL-MCNC: 1.09 NG/ML
SODIUM SERPL-SCNC: 142 MMOL/L
TRIGL SERPL-MCNC: 192 MG/DL

## 2020-01-12 PROBLEM — R31.29 MICROSCOPIC HEMATURIA: Status: RESOLVED | Noted: 2017-11-13 | Resolved: 2020-01-12

## 2020-01-12 NOTE — HISTORY OF PRESENT ILLNESS
[de-identified] : \par \par last ate > 4 hrs\par \par hx [facility] 12/4/19- dx'd with right Montero's d/p prednisone/valtrex, had negative CT brain- getting better, speaking/eating better, eye closing better\par -doing home massaging\par -awaiting neuro eval 3/20\par -hx optho eval 8/19- f/u pending\par \par lost 6lbs intentionally - eating heathier, more fruit, less carbs and doing more exercising\par walks 30 mins daily\par \par hx missed DM Rx's - better 24 d.\par taking metformin 1-2 x/d, avoid if AM fs in 60-70--> missed 1-2x/wk-- missed 2xin 24 d [FreeTextEntry1] : \par Accompanied by wife\par \par Feeling well.\par Last ate > 4 hrs ago\par Needs med refills.\par \par hx [facility] 12/4/19- dx'd with right Montero's d/p prednisone/valtrex, had negative CT brain- getting better > 90%, overall speaking/eating better, eye closing better\par -doing home massaging\par -awaiting neuro eval 3/20\par -hx optho eval 8/19- f/u pending\par \par \par hx CAD s/p CABG 12/2011, CHF, HTN, HLD-\par -followed by cardio- seen 11/19 no changes made, has f/u 2/20.\par -better with ADA/low salt and fat diet in recently and has lost weight intentionally\par -no formal exercise but stays active daily at work, occ walks in neighborhood\par -denies HA, dizziness, CP, sob, leg edema, orthopnea or PND\par -followed by optho\par \par DM- reports adherent with all meds in past 24d ,was not prior to then.  Also eating better, healthier, less carbs more fruit and exercising- has lost ~ 6l bs\par -exercising: walking daily x 30 minutes\par -on metformin taking 1-2 x/d, avoids if AM fs in 60-70--> missed  2x in past 24 d\par -on onglyza\par -on glimepiride, dose increased to 2mg qd 8/18\par -home fs checks (AM) , no postprandial checks; denies hypoglycemic sx's, polyuria or polydipsia. \par -followed by optho\par -followed by podiatry, seen 2016 told nl- f/u pending\par \par Reports nl appetite and BMs.  \par Denies GI complaints.\par \par Denies  complaints.\par

## 2020-01-12 NOTE — ASSESSMENT
[FreeTextEntry1] : \par hx right Bell's palsy- s/p ER eval 12/19 with negative CT brain for acute pathology (age indeterminate lacunar infarcts noted), s/p valtrex/prednisone course\par -followed by optho\par -awaiting neuro eval 3/20\par -getting massages, declines PT referral\par \par CAD s/p CABG 12/2011, CHF, HTN, HLD, proteinuria- asx\par -6/16 echo: mild to mod LV dysfxn, inferoseptal hypokinesis\par -8/19 Tchol 313  HDL 31- check repeat\par -8/19 UA: +prt--> 2/18 prt/crt abnl; 1/19 prt/crt 0.6  Renal eval pending.  US renal pending.\par -8/19 bmp wnl Na 133, check repeat\par -followed by cardio- states last seen 11/19 no changes made, has f/u 2/20.\par -on ASA/lipitor/coreg/lasix prn/losartan (hx cough 2/2 ACE-I).  Importance of med adherence counseled- adverse outcome with nonadherence (ie MI, death) counseled.\par -followed by optho- hx nl dilated exam to f/u 1 yr, last seen 8/19 re: Bell's palsy per pt.  F/U pending.\par -low salt and fat diet advised\par -advised to avoid NSAIDs\par -check prt/crt\par \par DM- 8/19 A1c 11.4 (was 8.2); microalbumin 980 (was 471)\par -endo referral given prior, pending\par -on metformin, glimepiride and Onglyza- adherence counseled.  Adverse outcomes of uncontrolled DM counseled.\par -on ARB\par -home fs monitoring encouraged\par -followed by optho\par -followed by podiatry, seen 2016 told nl.  Referral given.  Good DM foot hygiene counseled\par -ADA diet, exercise and wt loss counseled\par -declines DM educator referral\par -check A1c and microalbumin\par \par hx anemia, B12 def- asx\par -11/17 FIT negative, declines repeat\par -5/19 cbc/B12/folate wnl\par -on B12 supplement\par -hx screening colonoscopy 8/12: polyps, internal hemorrhoids, rec: repeat in 5 yrs (Dr. Davis)- GI f/u advised and referral given\par \par hx microscopic hematuria- resolved on repeat; asx\par -1/17 UA +rbc\par -1/19 UA prt 100, no rbc\par -8/19 UA  no blood\par \par overweight- \par -healthy eating, exercise and cont'd wt loss counseled.\par \par \par HCM\par -hx CPE 1/19, yearly advised\par -5/19 cbc/b12/folate/TSH wnl\par -1/19 PSA wnl, check repeat\par -flu shot today\par -hx Tdap 2012\par -hx PVX 2012\par -hx prevnar 9/16\par -hx hep B series\par -hx screening colonoscopy 8/12: polyps, internal hemorrhoids, rec: repeat in 5 yrs (Dr. Davis)- GI f/u advised, hesitant, 11/17 FIT card- negative, declines repeat.\par -derm referral for screening- pending.  Regular use of sun block for skin cancer prevention counseled\par \par Pt has prior scheduled f/u for CPE 4/20/20.\par Pt's cell: [phone]\par Pt requests wife, Jana be called if unable to reach him re; his medical care.  Her cell: [phone]

## 2020-01-12 NOTE — REVIEW OF SYSTEMS
[Negative] : Psychiatric [see HPI] : see HPI [Earache] : no earache [Sore Throat] : no sore throat [Dizziness] : no dizziness [Fainting] : no fainting [Limb Weakness] : no limb weakness [Difficulty Walking] : no difficulty walking

## 2020-01-12 NOTE — PHYSICAL EXAM
[General Appearance - Alert] : alert [General Appearance - In No Acute Distress] : in no acute distress [General Appearance - Well-Appearing] : healthy appearing [Sclera] : the sclera and conjunctiva were normal [PERRL With Normal Accommodation] : pupils were equal in size, round, and reactive to light [Extraocular Movements] : extraocular movements were intact [Oropharynx] : the oropharynx was normal [Nasal Cavity] : the nasal mucosa and septum were normal [Outer Ear] : the ears and nose were normal in appearance [Neck Appearance] : the appearance of the neck was normal [Thyroid Diffuse Enlargement] : the thyroid was not enlarged [Auscultation Breath Sounds / Voice Sounds] : lungs were clear to auscultation bilaterally [Heart Rate And Rhythm] : heart rate was normal and rhythm regular [Respiration, Rhythm And Depth] : normal respiratory rhythm and effort [Heart Sounds] : normal S1 and S2 [Full Pulse] : the pedal pulses are present [Murmurs] : no murmurs [Edema] : there was no peripheral edema [Bowel Sounds] : normal bowel sounds [Abdomen Soft] : soft [Cervical Lymph Nodes Enlarged Posterior Bilaterally] : posterior cervical [Abdomen Tenderness] : non-tender [Cervical Lymph Nodes Enlarged Anterior Bilaterally] : anterior cervical [Supraclavicular Lymph Nodes Enlarged Bilaterally] : supraclavicular [No CVA Tenderness] : no ~M costovertebral angle tenderness [Musculoskeletal - Swelling] : no joint swelling seen [No Spinal Tenderness] : no spinal tenderness [Abnormal Walk] : normal gait [] : no rash [Mood] : the mood was normal [Oriented To Time, Place, And Person] : oriented to person, place, and time [Affect] : the affect was normal [FreeTextEntry1] : mild right facial droop (improved per pt)

## 2020-01-31 ENCOUNTER — APPOINTMENT (OUTPATIENT)
Dept: OPHTHALMOLOGY | Facility: CLINIC | Age: 60
End: 2020-01-31
Payer: MEDICAID

## 2020-01-31 ENCOUNTER — NON-APPOINTMENT (OUTPATIENT)
Age: 60
End: 2020-01-31

## 2020-01-31 PROCEDURE — 92012 INTRM OPH EXAM EST PATIENT: CPT

## 2020-02-10 ENCOUNTER — APPOINTMENT (OUTPATIENT)
Dept: NEPHROLOGY | Facility: CLINIC | Age: 60
End: 2020-02-10
Payer: MEDICAID

## 2020-02-10 VITALS
DIASTOLIC BLOOD PRESSURE: 83 MMHG | OXYGEN SATURATION: 98 % | BODY MASS INDEX: 27.32 KG/M2 | SYSTOLIC BLOOD PRESSURE: 163 MMHG | HEART RATE: 56 BPM | WEIGHT: 170 LBS | HEIGHT: 66 IN

## 2020-02-10 PROCEDURE — 99204 OFFICE O/P NEW MOD 45 MIN: CPT

## 2020-02-14 NOTE — PHYSICAL EXAM
[General Appearance - Alert] : alert [General Appearance - In No Acute Distress] : in no acute distress [General Appearance - Well Nourished] : well nourished [General Appearance - Well Developed] : well developed [Sclera] : the sclera and conjunctiva were normal [General Appearance - Well-Appearing] : healthy appearing [PERRL With Normal Accommodation] : pupils were equal in size, round, and reactive to light [Hearing Threshold Finger Rub Not Gates] : hearing was normal [Outer Ear] : the ears and nose were normal in appearance [Oropharynx] : the oropharynx was normal [Examination Of The Oral Cavity] : the lips and gums were normal [Neck Appearance] : the appearance of the neck was normal [Jugular Venous Distention Increased] : there was no jugular-venous distention [Neck Cervical Mass (___cm)] : no neck mass was observed [Respiration, Rhythm And Depth] : normal respiratory rhythm and effort [Exaggerated Use Of Accessory Muscles For Inspiration] : no accessory muscle use [Auscultation Breath Sounds / Voice Sounds] : lungs were clear to auscultation bilaterally [Heart Rate And Rhythm] : heart rate was normal and rhythm regular [Heart Sounds] : normal S1 and S2 [Heart Sounds Gallop] : no gallops [Arterial Pulses Carotid] : carotid pulses were normal with no bruits [Edema] : there was no peripheral edema [Veins - Varicosity Changes] : there were no varicosital changes [Abdomen Soft] : soft [Bowel Sounds] : normal bowel sounds [Abdomen Tenderness] : non-tender [No CVA Tenderness] : no ~M costovertebral angle tenderness [No Spinal Tenderness] : no spinal tenderness [Abnormal Walk] : normal gait [Nail Clubbing] : no clubbing  or cyanosis of the fingernails [Musculoskeletal - Swelling] : no joint swelling seen [Skin Color & Pigmentation] : normal skin color and pigmentation [Motor Tone] : muscle strength and tone were normal [Skin Turgor] : normal skin turgor [Skin Lesions] : no skin lesions [] : no rash [Cranial Nerves] : cranial nerves 2-12 were intact [Deep Tendon Reflexes (DTR)] : deep tendon reflexes were 2+ and symmetric [Sensation] : the sensory exam was normal to light touch and pinprick [Motor Exam] : the motor exam was normal [Impaired Insight] : insight and judgment were intact [Oriented To Time, Place, And Person] : oriented to person, place, and time [Mood] : the mood was normal [Affect] : the affect was normal

## 2020-02-14 NOTE — ASSESSMENT
[FreeTextEntry1] : proteinuria- likely sec to DM. Complete serologic work up. Renal USG. On Losartan 100 mg daily.

## 2020-02-14 NOTE — HISTORY OF PRESENT ILLNESS
[FreeTextEntry1] : Referred for proteinuria \par \par 58 yo male with CAD s/p CABG, HTN, HLD, Recent admission to [facility] in Dec 2019 with right Bell's palsy s/p prednisone/ Valtrex, DM ( was uncontrolled but recently better, no retinopathy) referred for proteinuria. Urine protein/creat ratio - 0.9 gm/gm in Jan 2020. serum creat 0.9 mg/dl with an eGFR of 93 ml/min. no hematuria\par \par no nsaids \par no recent fever/ infection \par no antibiotics \par \par ROS \par gu - no changes in urination, no blood in urine\par CVS- No chest pain, no shortness of breath \par GI - no abdominal pain, no diarrhea\par all other systems reviewed in detail and were negative except as above \par \par

## 2020-02-23 ENCOUNTER — FORM ENCOUNTER (OUTPATIENT)
Age: 60
End: 2020-02-23

## 2020-02-24 ENCOUNTER — OUTPATIENT (OUTPATIENT)
Dept: OUTPATIENT SERVICES | Facility: HOSPITAL | Age: 60
LOS: 1 days | End: 2020-02-24
Payer: MEDICAID

## 2020-02-24 ENCOUNTER — APPOINTMENT (OUTPATIENT)
Dept: ULTRASOUND IMAGING | Facility: IMAGING CENTER | Age: 60
End: 2020-02-24
Payer: MEDICAID

## 2020-02-24 DIAGNOSIS — R80.9 PROTEINURIA, UNSPECIFIED: ICD-10-CM

## 2020-02-24 PROCEDURE — 76770 US EXAM ABDO BACK WALL COMP: CPT | Mod: 26

## 2020-02-24 PROCEDURE — 76770 US EXAM ABDO BACK WALL COMP: CPT

## 2020-02-27 ENCOUNTER — APPOINTMENT (OUTPATIENT)
Dept: CARDIOLOGY | Facility: HOSPITAL | Age: 60
End: 2020-02-27

## 2020-02-27 VITALS
SYSTOLIC BLOOD PRESSURE: 165 MMHG | BODY MASS INDEX: 27.48 KG/M2 | HEART RATE: 50 BPM | DIASTOLIC BLOOD PRESSURE: 89 MMHG | OXYGEN SATURATION: 100 % | HEIGHT: 66 IN | RESPIRATION RATE: 16 BRPM | TEMPERATURE: 97.5 F | WEIGHT: 171 LBS

## 2020-03-03 ENCOUNTER — APPOINTMENT (OUTPATIENT)
Dept: NEPHROLOGY | Facility: CLINIC | Age: 60
End: 2020-03-03
Payer: MEDICAID

## 2020-03-03 VITALS
OXYGEN SATURATION: 97 % | SYSTOLIC BLOOD PRESSURE: 148 MMHG | HEART RATE: 52 BPM | DIASTOLIC BLOOD PRESSURE: 77 MMHG | BODY MASS INDEX: 27.64 KG/M2 | WEIGHT: 171.96 LBS | HEIGHT: 66 IN

## 2020-03-03 PROCEDURE — 99214 OFFICE O/P EST MOD 30 MIN: CPT

## 2020-03-03 NOTE — HISTORY OF PRESENT ILLNESS
[FreeTextEntry1] : 60 yo male with CAD s/p CABG, HTN, HLD, Recent admission to [facility] in Dec 2019 with right Bell's palsy s/p prednisone/ Valtrex, DM ( was uncontrolled but recently better, no retinopathy) referred for proteinuria. Urine protein/creat ratio - 0.9 gm/gm in Jan 2020. serum creat 0.9 mg/dl with an eGFR of 93 ml/min. no hematuria\par \par no nsaids \par no recent fever/ infection \par no antibiotics \par \par ROS \par gu - no changes in urination, no blood in urine\par CVS- No chest pain, no shortness of breath \par GI - no abdominal pain, no diarrhea\par all other systems reviewed in detail and were negative except as above \par \par since his last visit, he had serological work up done that was negative. A renal ultrasound done showed right kidney- 9.8 cm/ mildly increased cortical echogenicity. LEFT kidney- 9.4 cm/ mildly increased echogenicity. no hydronephrosis. \par \par

## 2020-03-03 NOTE — PHYSICAL EXAM
[General Appearance - Alert] : alert [General Appearance - Well Nourished] : well nourished [General Appearance - In No Acute Distress] : in no acute distress [General Appearance - Well Developed] : well developed [Sclera] : the sclera and conjunctiva were normal [PERRL With Normal Accommodation] : pupils were equal in size, round, and reactive to light [Outer Ear] : the ears and nose were normal in appearance [Hearing Threshold Finger Rub Not [place]] : hearing was normal [Examination Of The Oral Cavity] : the lips and gums were normal [Oropharynx] : the oropharynx was normal [Neck Appearance] : the appearance of the neck was normal [Neck Cervical Mass (___cm)] : no neck mass was observed [Jugular Venous Distention Increased] : there was no jugular-venous distention [Respiration, Rhythm And Depth] : normal respiratory rhythm and effort [Auscultation Breath Sounds / Voice Sounds] : lungs were clear to auscultation bilaterally [Exaggerated Use Of Accessory Muscles For Inspiration] : no accessory muscle use [Heart Sounds] : normal S1 and S2 [Heart Rate And Rhythm] : heart rate was normal and rhythm regular [Heart Sounds Gallop] : no gallops [Heart Sounds Pericardial Friction Rub] : no pericardial rub [Bowel Sounds] : normal bowel sounds [Abdomen Soft] : soft [Abdomen Tenderness] : non-tender [No CVA Tenderness] : no ~M costovertebral angle tenderness [No Spinal Tenderness] : no spinal tenderness [Nail Clubbing] : no clubbing  or cyanosis of the fingernails [Abnormal Walk] : normal gait [Musculoskeletal - Swelling] : no joint swelling seen [] : no rash [Skin Color & Pigmentation] : normal skin color and pigmentation [Skin Turgor] : normal skin turgor [Skin Lesions] : no skin lesions [Cranial Nerves] : cranial nerves 2-12 were intact [Sensation] : the sensory exam was normal to light touch and pinprick [Deep Tendon Reflexes (DTR)] : deep tendon reflexes were 2+ and symmetric [Oriented To Time, Place, And Person] : oriented to person, place, and time [Impaired Insight] : insight and judgment were intact [Motor Exam] : the motor exam was normal [Mood] : the mood was normal [Affect] : the affect was normal

## 2020-03-03 NOTE — ASSESSMENT
[FreeTextEntry1] : Proteinuria- likely sec to dm. serological work up negative. on losartan 100 mg daily. continue. proteinuria - 0.9 gm/gm. salt restriction. creat normal/ \par \par HTN- BP well controlled. \par \par follow up 6 months. \par \par

## 2020-03-04 LAB
ALBUMIN MFR SERPL ELPH: 50.2 %
ALBUMIN SERPL-MCNC: 3.8 G/DL
ALBUMIN/GLOB SERPL: 1 RATIO
ALPHA1 GLOB MFR SERPL ELPH: 3.9 %
ALPHA1 GLOB SERPL ELPH-MCNC: 0.3 G/DL
ALPHA2 GLOB MFR SERPL ELPH: 12.2 %
ALPHA2 GLOB SERPL ELPH-MCNC: 0.9 G/DL
ANION GAP SERPL CALC-SCNC: 10 MMOL/L
APPEARANCE: CLEAR
B-GLOBULIN MFR SERPL ELPH: 14.5 %
B-GLOBULIN SERPL ELPH-MCNC: 1.1 G/DL
BACTERIA: NEGATIVE
BILIRUBIN URINE: NEGATIVE
BLOOD URINE: NEGATIVE
BUN SERPL-MCNC: 14 MG/DL
CALCIUM SERPL-MCNC: 8.9 MG/DL
CHLORIDE SERPL-SCNC: 105 MMOL/L
CO2 SERPL-SCNC: 24 MMOL/L
COLOR: COLORLESS
CREAT SERPL-MCNC: 0.9 MG/DL
DEPRECATED KAPPA LC FREE/LAMBDA SER: 1.08 RATIO
GAMMA GLOB FLD ELPH-MCNC: 1.5 G/DL
GAMMA GLOB MFR SERPL ELPH: 19.2 %
GLUCOSE QUALITATIVE U: NEGATIVE
GLUCOSE SERPL-MCNC: 79 MG/DL
HBV SURFACE AG SER QL: NONREACTIVE
HCV AB SER QL: NONREACTIVE
HCV S/CO RATIO: 0.7 S/CO
HYALINE CASTS: 0 /LPF
INTERPRETATION SERPL IEP-IMP: NORMAL
KAPPA LC CSF-MCNC: 4.57 MG/DL
KAPPA LC SERPL-MCNC: 4.92 MG/DL
KETONES URINE: NEGATIVE
LEUKOCYTE ESTERASE URINE: NEGATIVE
MICROSCOPIC-UA: NORMAL
NITRITE URINE: NEGATIVE
PH URINE: 5
POTASSIUM SERPL-SCNC: 5 MMOL/L
PROT SERPL-MCNC: 7.6 G/DL
PROT SERPL-MCNC: 7.6 G/DL
PROTEIN URINE: NORMAL
RED BLOOD CELLS URINE: 0 /HPF
SODIUM SERPL-SCNC: 139 MMOL/L
SPECIFIC GRAVITY URINE: 1.01
SQUAMOUS EPITHELIAL CELLS: 0 /HPF
UROBILINOGEN URINE: NORMAL
WHITE BLOOD CELLS URINE: 0 /HPF

## 2020-03-05 ENCOUNTER — APPOINTMENT (OUTPATIENT)
Dept: DERMATOLOGY | Facility: CLINIC | Age: 60
End: 2020-03-05
Payer: MEDICAID

## 2020-03-05 VITALS — WEIGHT: 170 LBS | HEIGHT: 72 IN | BODY MASS INDEX: 23.03 KG/M2

## 2020-03-05 VITALS — HEIGHT: 66 IN

## 2020-03-05 DIAGNOSIS — Z12.83 ENCOUNTER FOR SCREENING FOR MALIGNANT NEOPLASM OF SKIN: ICD-10-CM

## 2020-03-05 DIAGNOSIS — D22.9 MELANOCYTIC NEVI, UNSPECIFIED: ICD-10-CM

## 2020-03-05 DIAGNOSIS — D23.9 OTHER BENIGN NEOPLASM OF SKIN, UNSPECIFIED: ICD-10-CM

## 2020-03-05 PROCEDURE — 99203 OFFICE O/P NEW LOW 30 MIN: CPT

## 2020-03-10 ENCOUNTER — APPOINTMENT (OUTPATIENT)
Dept: GASTROENTEROLOGY | Facility: CLINIC | Age: 60
End: 2020-03-10
Payer: MEDICAID

## 2020-03-10 VITALS
HEIGHT: 66 IN | SYSTOLIC BLOOD PRESSURE: 147 MMHG | WEIGHT: 170 LBS | HEART RATE: 64 BPM | BODY MASS INDEX: 27.32 KG/M2 | DIASTOLIC BLOOD PRESSURE: 78 MMHG

## 2020-03-10 DIAGNOSIS — Z87.19 PERSONAL HISTORY OF OTHER DISEASES OF THE DIGESTIVE SYSTEM: ICD-10-CM

## 2020-03-10 DIAGNOSIS — Z86.010 PERSONAL HISTORY OF COLONIC POLYPS: ICD-10-CM

## 2020-03-10 PROCEDURE — 99203 OFFICE O/P NEW LOW 30 MIN: CPT

## 2020-03-10 PROCEDURE — 82274 ASSAY TEST FOR BLOOD FECAL: CPT | Mod: QW

## 2020-03-10 NOTE — ASSESSMENT
[FreeTextEntry1] : 1.  Polyp at colonoscopy August 2012, gastric ulcers with Helicobacter pylori and hiatal hernia at EGD August 2012, with heme positive stool on today's exam--rule out metachronous colorectal neoplasia, aspirin-induced irritation, etc.\par 2.  Overweight.\par 3.  Type 2 diabetes mellitus, suboptimally controlled.\par 4.  History of MI, status post CABG x 4 in 2011.\par 5.  Hypertension.\par 6.  Hyperlipidemia.\par 7.  Ex-smoker.\par 8.  Right Bell palsy, improving.\par 9.  Allergic to lisinopril.\par \par Plan: \par 1.  Schedule repeat colonoscopy, with probable EGD to follow--Procedures, rationale, anesthesia plan, and prep instructions were reviewed and brochures given. He is aware of the need to adjust DM meds preoperatively.\par 2.  Probable PPI after EGD, given history of gastric ulcers, on long-term ASA. \par 1.

## 2020-03-10 NOTE — REVIEW OF SYSTEMS
[Chills] : chills [Eye Pain] : eye pain [Earache] : earache [Arthralgias] : arthralgias [Joint Pain] : joint pain [Negative] : Heme/Lymph [As Noted in HPI] : as noted in HPI

## 2020-03-10 NOTE — HISTORY OF PRESENT ILLNESS
[FreeTextEntry1] : A tiny polyp was removed at colonoscopy August 2012 (Dr. Davis).  Several gastric ulcers with H. pylori gastritis were noted at EGD August 2012.  Other than occasional constipation, Joanne denies GI symptoms at this time.

## 2020-03-10 NOTE — CONSULT LETTER
[Dear  ___] : Dear  [unfilled], [Consult Letter:] : I had the pleasure of evaluating your patient, [unfilled]. [Please see my note below.] : Please see my note below. [Consult Closing:] : Thank you very much for allowing me to participate in the care of this patient.  If you have any questions, please do not hesitate to contact me. [Sincerely,] : Sincerely, [FreeTextEntry3] : Evert Ghosh M.D.\par

## 2020-03-10 NOTE — REASON FOR VISIT
[Consultation] : a consultation visit [FreeTextEntry1] : Pre Colonoscopy exam, Iron deficiency anemia

## 2020-03-10 NOTE — PHYSICAL EXAM
[General Appearance - Alert] : alert [General Appearance - In No Acute Distress] : in no acute distress [General Appearance - Well Nourished] : well nourished [General Appearance - Well Developed] : well developed [Sclera] : the sclera and conjunctiva were normal [Outer Ear] : the ears and nose were normal in appearance [Oropharynx] : the oropharynx was normal [Neck Appearance] : the appearance of the neck was normal [Neck Cervical Mass (___cm)] : no neck mass was observed [Jugular Venous Distention Increased] : there was no jugular-venous distention [Thyroid Diffuse Enlargement] : the thyroid was not enlarged [Thyroid Nodule] : there were no palpable thyroid nodules [Auscultation Breath Sounds / Voice Sounds] : lungs were clear to auscultation bilaterally [Heart Rate And Rhythm] : heart rate was normal and rhythm regular [Heart Sounds] : normal S1 and S2 [Heart Sounds Gallop] : no gallops [Murmurs] : no murmurs [Heart Sounds Pericardial Friction Rub] : no pericardial rub [Full Pulse] : the pedal pulses are present [Edema] : there was no peripheral edema [Bowel Sounds] : normal bowel sounds [Abdomen Soft] : soft [Abdomen Tenderness] : non-tender [Abdomen Mass (___ Cm)] : no abdominal mass palpated [Abdomen Hernia] : no hernia was discovered [Normal Sphincter Tone] : normal sphincter tone [No Rectal Mass] : no rectal mass [Occult Blood Positive] : stool positive for occult blood [Prostate Size___ (Scale 0-4)] : prostate size was [unfilled] on a scale of 0-4 [Cervical Lymph Nodes Enlarged Posterior Bilaterally] : posterior cervical [Cervical Lymph Nodes Enlarged Anterior Bilaterally] : anterior cervical [Supraclavicular Lymph Nodes Enlarged Bilaterally] : supraclavicular [Axillary Lymph Nodes Enlarged Bilaterally] : axillary [Femoral Lymph Nodes Enlarged Bilaterally] : femoral [Inguinal Lymph Nodes Enlarged Bilaterally] : inguinal [No CVA Tenderness] : no ~M costovertebral angle tenderness [No Spinal Tenderness] : no spinal tenderness [Abnormal Walk] : normal gait [Nail Clubbing] : no clubbing  or cyanosis of the fingernails [Musculoskeletal - Swelling] : no joint swelling seen [Motor Tone] : muscle strength and tone were normal [Skin Color & Pigmentation] : normal skin color and pigmentation [Skin Turgor] : normal skin turgor [] : no rash [Oriented To Time, Place, And Person] : oriented to person, place, and time [Impaired Insight] : insight and judgment were intact [Affect] : the affect was normal [Internal Hemorrhoid] : no internal hemorrhoids [External Hemorrhoid] : no external hemorrhoids [Prostate Tenderness] : was not tender [FreeTextEntry1] : right facial droop

## 2020-03-13 ENCOUNTER — APPOINTMENT (OUTPATIENT)
Dept: OPHTHALMOLOGY | Facility: CLINIC | Age: 60
End: 2020-03-13

## 2020-03-16 ENCOUNTER — LABORATORY RESULT (OUTPATIENT)
Age: 60
End: 2020-03-16

## 2020-03-16 ENCOUNTER — APPOINTMENT (OUTPATIENT)
Dept: GASTROENTEROLOGY | Facility: CLINIC | Age: 60
End: 2020-03-16
Payer: MEDICAID

## 2020-03-16 PROCEDURE — 43239 EGD BIOPSY SINGLE/MULTIPLE: CPT | Mod: 59

## 2020-03-16 PROCEDURE — 45378 DIAGNOSTIC COLONOSCOPY: CPT

## 2020-03-17 ENCOUNTER — APPOINTMENT (OUTPATIENT)
Dept: NEUROLOGY | Facility: CLINIC | Age: 60
End: 2020-03-17

## 2020-03-18 DIAGNOSIS — K64.8 OTHER HEMORRHOIDS: ICD-10-CM

## 2020-03-18 DIAGNOSIS — Z87.19 PERSONAL HISTORY OF OTHER DISEASES OF THE DIGESTIVE SYSTEM: ICD-10-CM

## 2020-03-23 NOTE — DISCUSSION/SUMMARY
[FreeTextEntry1] : Mr. Joanne Smyth is a 57 year old gentleman with a CAD status post 4-vessel CABG, HFrEF (ICM, mild-mod LV dysfunction) previously with a LifeVest, diabetes following up in cardiology clinic for optimization of his CAD and heart failure regimen.\par \par # Heart Failure -   On an excellent regimen of beta blocker (Coreg), ACE/ARB (losartan, had possible ACE-inhibitor associated cough with lisinopril), diuretic (lasix). Currently compensated. \par -  ASA 81mg PO Qday\par -  Coreg 25mg PO BID\par -  Lipitor 80mg PO Qday\par -  Losartan 100mg PO Qday\par -  Lasix 20mg PO Qday (up to 40mg PRN Weight Gain)\par - Diet and exercise counseling provided during visit\par \par # HTN - Uncontrolled today. On Coreg and Losartan.   \par -  will add amlodipine 5mg daily\par \par # HYPERLIPIDEMIA \par -continue high dose statin\par \par # Coronary Artery Disease S/P CABG - He will need to continue aspirin and statin daily\par \par RTC 3 months

## 2020-03-23 NOTE — REVIEW OF SYSTEMS
1900 - Bedside and Verbal shift change report given to [address] [place] [address] (oncoming nurse) by Camron Tian LPN (offgoing nurse). Report included the following information SBAR, Kardex and MAR.     0700 - Bedside and Verbal shift change report given to [facility] LPN (oncoming nurse) by Fahad Bush RN (offgoing nurse). Report included the following information SBAR, Kardex and MAR. NAME OF PATIENT:  Madonna Carrol [facility]    LEVEL OF CARE:  RESPITE    REASON FOR GIP:   N/A    *PATIENT REMAINS ELIGIBLE FOR [facility] LEVEL OF CARE AS EVIDENCED BY: (MUST BE ADDRESSED OF PATIENT GIP)      REASON FOR RESPITE:  Caregiver breakdown    O2 SAFETY:  N/A    FALL INTERVENTIONS PROVIDED:   Implemented/recommended use of non-skid footwear, Implemented/recommended use of fall risk identification flag to all team members, Implemented/recommended assistive devices and encouraged their use, Implemented/recommended resources for alarm system (personal alarm, bed alarm, call bell, etc.) , Implemented/recommended environmental changes (remove hazards, lower bed, improve lighting, etc.) and Implemented/recommended increased supervision/assistance    INTERDISPLINARY COMMUNICATION/COLLABORATION:  Physician, MSW, Ally and RN, CNA    NEW MEDICATION INITIATION DOCUMENTATION:  N/A    Reason medication is being initiated:  N/A    MD / Provider name consulted re: change in status / initiation of new medication:  N/A    New Symptom(s):  N/A    New Order(s):  N/A    Name of the person notified of the changes:  N/A    Name of person being taught:  N/A    Instructions given:  N/A    Side Effects taught:  N/A    Response to teaching:  N/A      COMFORTABLE DYING MEASURE:  Is Patient/family satisfied with symptom level?  yes    DISCHARGE PLAN:  Will return home after 5-day stay with family under supervision of [facility]. [Cough] : cough [Negative] : Neurological [Shortness Of Breath] : no shortness of breath [Dyspnea on exertion] : not dyspnea during exertion [Chest Pain] : no chest pain [Lower Ext Edema] : no extremity edema [Palpitations] : no palpitations

## 2020-03-23 NOTE — PHYSICAL EXAM
[General Appearance - In No Acute Distress] : no acute distress [Normal Appearance] : normal appearance [Normal Oral Mucosa] : normal oral mucosa [Normal Conjunctiva] : the conjunctiva exhibited no abnormalities [] : no respiratory distress [Auscultation Breath Sounds / Voice Sounds] : lungs were clear to auscultation bilaterally [Edema] : no peripheral edema present [Heart Sounds] : normal S1 and S2 [Murmurs] : no murmurs present [Bowel Sounds] : normal bowel sounds [Abdomen Tenderness] : non-tender [Abdomen Soft] : soft [Skin Turgor] : normal skin turgor [Nail Clubbing] : no clubbing of the fingernails [Affect] : the affect was normal [FreeTextEntry1] : No JVD

## 2020-05-12 ENCOUNTER — RX RENEWAL (OUTPATIENT)
Age: 60
End: 2020-05-12

## 2020-05-20 ENCOUNTER — RX RENEWAL (OUTPATIENT)
Age: 60
End: 2020-05-20

## 2020-07-30 ENCOUNTER — NON-APPOINTMENT (OUTPATIENT)
Age: 60
End: 2020-07-30

## 2020-07-30 ENCOUNTER — APPOINTMENT (OUTPATIENT)
Dept: CARDIOLOGY | Facility: HOSPITAL | Age: 60
End: 2020-07-30

## 2020-07-30 VITALS
TEMPERATURE: 97.7 F | OXYGEN SATURATION: 97 % | WEIGHT: 182 LBS | BODY MASS INDEX: 29.38 KG/M2 | RESPIRATION RATE: 16 BRPM | DIASTOLIC BLOOD PRESSURE: 92 MMHG | HEART RATE: 62 BPM | SYSTOLIC BLOOD PRESSURE: 155 MMHG

## 2020-07-30 NOTE — DISCUSSION/SUMMARY
[FreeTextEntry1] : Mr. Joanne Smyth is a 59 year old gentleman with a CAD status post 4-vessel CABG, HFrEF (ICM, mild-mod LV dysfunction) previously with a LifeVest, diabetes following up in cardiology clinic for optimization of his CAD and heart failure regimen.\par \par # Heart Failure -   On an excellent regimen of beta blocker (Coreg), ACE/ARB (losartan, had possible ACE-inhibitor associated cough with lisinopril), diuretic (lasix). Currently compensated. NYHA class 1. \par -  ASA 81mg PO Qday\par -  Coreg 25mg PO BID\par -  Lipitor 80mg PO Qday\par -  Losartan 100mg PO Qday, Cr 0.9, K 5.0\par -  Lasix 20mg PO Qday (up to 40mg PRN Weight Gain)\par - Diet and exercise counseling provided during visit\par \par # HTN - Uncontrolled today. On Coreg and Losartan.   \par -  added amlodipine 5mg last visit, to continue\par \par # HYPERLIPIDEMIA \par -continue high dose statin, goal LDL would be <70, advised lifestyle modification and will recheck lipid panel next visit\par \par # Coronary Artery Disease S/P CABG - He will need to continue aspirin and statin daily\par \par #DM\par -- C/w glimepiride and statin, last A1c 8.1%, advised intense lifestyle modification\par \par Discussed with Dr. Joy\par \par Signed by Dr. Duarte Pierre MD\par RTC 3 months

## 2020-07-30 NOTE — HISTORY OF PRESENT ILLNESS
[FreeTextEntry1] : 59-year-old M with a PMH of CAD S/P 4-vessel CABG (12/2011), mild-moderate LV dysfunction, HTN, dyslipidemia and diabetes following up in cardiology clinic for optimization of his CAD and heart failure regimen. \par \par Today, reports no SOB or chest pain at rest or with exertion.\par No LE edema or palp. No orthopnea or PND.

## 2020-07-30 NOTE — PHYSICAL EXAM
[Normal Appearance] : normal appearance [Normal Conjunctiva] : the conjunctiva exhibited no abnormalities [General Appearance - In No Acute Distress] : no acute distress [Normal Oral Mucosa] : normal oral mucosa [Heart Sounds] : normal S1 and S2 [] : no respiratory distress [Auscultation Breath Sounds / Voice Sounds] : lungs were clear to auscultation bilaterally [Edema] : no peripheral edema present [Murmurs] : no murmurs present [Bowel Sounds] : normal bowel sounds [Abdomen Soft] : soft [Abdomen Tenderness] : non-tender [Nail Clubbing] : no clubbing of the fingernails [Skin Turgor] : normal skin turgor [Affect] : the affect was normal [FreeTextEntry1] : No JVD

## 2020-07-30 NOTE — REVIEW OF SYSTEMS
[Cough] : cough [Negative] : Psychiatric [Shortness Of Breath] : no shortness of breath [Chest Pain] : no chest pain [Dyspnea on exertion] : not dyspnea during exertion [Lower Ext Edema] : no extremity edema [Palpitations] : no palpitations

## 2020-08-11 ENCOUNTER — RX RENEWAL (OUTPATIENT)
Age: 60
End: 2020-08-11

## 2020-08-31 ENCOUNTER — APPOINTMENT (OUTPATIENT)
Dept: NEPHROLOGY | Facility: CLINIC | Age: 60
End: 2020-08-31
Payer: MEDICAID

## 2020-08-31 PROCEDURE — 99443: CPT

## 2020-08-31 NOTE — ASSESSMENT
[FreeTextEntry1] : Proteinuria- likely sec to dm. serological work up negative. on losartan 100 mg daily. continue. proteinuria - 0.9 gm/gm. salt restriction. creat normal/ \par \par HTN- BP is intermittently high. asked to record BP consistently at home and take the readings to his PCP \par \par CMP/ Urinalysis/ urine protein:creat ratio with next labs in late sep with Dr. Garay. \par \par follow up 6 months. \par \par

## 2020-08-31 NOTE — HISTORY OF PRESENT ILLNESS
[Home] : at home, [unfilled] , at the time of the visit. [Medical Office: ([facility])___] : at the medical office located in  [Verbal consent obtained from patient] : the patient, [unfilled] [FreeTextEntry1] : 59 yo male with CAD s/p CABG, HTN, HLD, Recent admission to [facility] in Dec 2019 with right Bell's palsy s/p prednisone/ Valtrex, DM ( was uncontrolled but recently better, no retinopathy) referred for proteinuria. Urine protein/creat ratio - 0.9 gm/gm in Jan 2020. serum creat 0.9 mg/dl with an eGFR of 93 ml/min. no hematuria\par \par no nsaids \par no recent fever/ infection \par no antibiotics \par \par ROS \par gu - no changes in urination, no blood in urine\par CVS- No chest pain, no shortness of breath \par GI - no abdominal pain, no diarrhea\par all other systems reviewed in detail and were negative except as above \par \par serological work up done that was negative. A renal ultrasound done showed right kidney- 9.8 cm/ mildly increased cortical echogenicity. LEFT kidney- 9.4 cm/ mildly increased echogenicity. no hydronephrosis. \par \par Since last visit, he has been doing well. denies any complaints.\par \par

## 2020-09-21 ENCOUNTER — APPOINTMENT (OUTPATIENT)
Dept: INTERNAL MEDICINE | Facility: CLINIC | Age: 60
End: 2020-09-21
Payer: MEDICAID

## 2020-09-21 VITALS
OXYGEN SATURATION: 98 % | HEART RATE: 56 BPM | RESPIRATION RATE: 16 BRPM | TEMPERATURE: 98.5 F | BODY MASS INDEX: 28.89 KG/M2 | DIASTOLIC BLOOD PRESSURE: 92 MMHG | WEIGHT: 179 LBS | SYSTOLIC BLOOD PRESSURE: 144 MMHG

## 2020-09-21 DIAGNOSIS — Z87.891 PERSONAL HISTORY OF NICOTINE DEPENDENCE: ICD-10-CM

## 2020-09-21 PROCEDURE — G0444 DEPRESSION SCREEN ANNUAL: CPT

## 2020-09-21 PROCEDURE — 99396 PREV VISIT EST AGE 40-64: CPT | Mod: 25

## 2020-09-21 PROCEDURE — G0008: CPT

## 2020-09-21 PROCEDURE — 90686 IIV4 VACC NO PRSV 0.5 ML IM: CPT

## 2020-09-21 PROCEDURE — 36415 COLL VENOUS BLD VENIPUNCTURE: CPT

## 2020-09-21 NOTE — HEALTH RISK ASSESSMENT
[0] : 2) Feeling down, depressed, or hopeless: Not at all (0) [Smoke Detector] : smoke detector [Carbon Monoxide Detector] : carbon monoxide detector [Seat Belt] :  uses seat belt [With Patient/Caregiver] : With Patient/Caregiver [HIV test declined] : HIV test declined [Fully functional (bathing, dressing, toileting, transferring, walking, feeding)] : Fully functional (bathing, dressing, toileting, transferring, walking, feeding) [Fully functional (using the telephone, shopping, preparing meals, housekeeping, doing laundry, using] : Fully functional and needs no help or supervision to perform IADLs (using the telephone, shopping, preparing meals, housekeeping, doing laundry, using transportation, managing medications and managing finances) [[id number]] : 0 [Guns at Home] : no guns at home [Sunscreen] : does not use sunscreen [ColonoscopyDate] : 03/20 [ColonoscopyComments] : internal hemorrhoids, no polyps, rec: repeat in 10 yrs (Dr. Ghosh) [HIVDate] : 11/17 [HIVComments] : negative [HepatitisCDate] : 11/17 [HepatitisCComments] : negative [AdvancecareDate] : 09/20

## 2020-09-21 NOTE — HISTORY OF PRESENT ILLNESS
[Health Maintenance] : health maintenance [Occupation ___] : occupation: [unfilled] [Former Cigarette Smoker] : is a former cigarette smoker [Quit Cigarettes: ___] : quit smoking [unfilled] [Rare Use] : rare alcohol use [Never] : has never used illicit drugs [Good] : good [Spouse] : spouse [] :  [Binge Drinking] : denies binge drinking [Patient Concern] : no personal concern about alcohol use [Family Concern] : no family concern about alcohol use [Reg. 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  Plans to do better soon.\par \par hx LIJ 12/4/19- dx'd with right Montero's d/p prednisone/valtrex, had negative CT brain- now > 80 % better, only residual is slight lower face droop, no issues with vision or eating\par -declines PT, doing home massaging\par -followed by neuro, reports never seen 3/20 as planned due to covid pandemic\par -followed by optho, seen 1/20 with ointment and f/u in 6 weeks advised.  F/U pending.\par \par \par hx CAD s/p CABG 12/2011, CHF, HLD, HTN, proteinuria-\par -followed by cardio- seen 7/20 no changes made, noted amlodipine 5mg added at 2/20 visit.  F/u pending 10/20.\par -followed by renal, seen 8/20, hx US renal 2/20.  Thought urine prt 2/2 DM.  Advised check cmp and urine prt with next PMD visit and to f/u in 6mo\par -reports adherent with all meds except amlodipine- taking ~ 1 x q 2 weeks as does not feel needs it.  Denies SEs.\par -not consistent with ADA/low salt diet\par -no formal exercise but stays active daily for work\par -denies HA, dizziness, CP, sob, leg edema, orthopnea or PND\par -followed by optho\par \par DM- \par -reports adherent with all meds\par -on metformin, onglyza and glimepiride\par -home fs: 150 - 190; denies hypoglycemic sx's, polyuria or polydipsia. \par -followed by optho\par -followed by podiatry, seen 2016 told nl- f/u pending.  Denies foot complaints.\par \par Reports nl appetite and BMs.  \par Denies GI complaints.\par \par Denies  complaints.\par \par Denies depression or anxiety.\par \par

## 2020-09-21 NOTE — REVIEW OF SYSTEMS
[see HPI] : see HPI [Negative] : Psychiatric [Dizziness] : no dizziness [Fainting] : no fainting [Limb Weakness] : no limb weakness [Difficulty Walking] : no difficulty walking

## 2020-09-21 NOTE — ASSESSMENT
[FreeTextEntry1] : \par hx right Bell's palsy- s/p ER eval 12/19 with negative CT brain for acute pathology (age indeterminate lacunar infarcts noted), s/p valtrex/prednisone course.  Improving.\par -followed by optho, seen 1/20 with ointment and f/u in 6 weeks advised. F/U pending.\par -followed by neuro, f/u pending- encouraged\par -declines PT referral\par \par CAD s/p CABG 12/2011, CHF, HTN, HLD, proteinuria- asx\par -6/16 echo: mild to mod LV dysfxn, inferoseptal hypokinesis\par -1/20 Tchol 161  HDL 40- check repeat\par -followed by cardio- seen 7/20 no changes made, noted amlodipine 5mg added at 2/20 visit. F/u pending 10/20.\par -followed by renal, seen 8/20, hx US renal 2/20. Thought urine prt 2/2 DM. Advised check cmp and urine- check today.\par -on ASA/lipitor/coreg/lasix prn/losartan (hx cough 2/2 ACE-I) and norvase 5 (started 2/20).  Importance of med adherence counseled- adverse outcome with nonadherence (ie MI, death) counseled.\par -followed by optho- seen 1/20 with ointment and f/u in 6 weeks advised. F/U pending.  Yearly DM screening advised.\par -low salt and fat diet advised\par -advised to avoid NSAIDs\par -advised to monitor BP at home and f/u if > 140/90 consistently\par -check cmp, lipids, UA, prt/crt\par \par DM- 1/20 A1c 8.1 (was 11.4); microalbumin +++\par -endo referral given prior, pending\par -on metformin, glimepiride and Onglyza- adherence counseled.  Adverse outcomes of uncontrolled DM counseled.\par -on ARB\par -home fs monitoring encouraged\par -followed by optho, seen 1/20\par -followed by podiatry, seen 2016 told nl.  Referral given.  DM foot exam wnl today.  Good DM foot hygiene counseled\par -ADA diet, exercise and wt loss counseled\par -declines DM educator referral\par -check A1c and microalbumin\par \par GERD, hx anemia, B12 def- asx\par -followed by GI\par -hx EGD 3/20: hiatal hernia, esophagitis (Dr. Ghosh).  \par -hx screening colonoscopy 8/20: no polyps, internal hemorrhoids, rec: repeat in 10 yrs (Dr. Ghosh)\par -on B12 supplement\par -on PPI\par -5/19 cbc/B12/folate wnl - check repeat\par \par hx microscopic hematuria- resolved on repeat; asx\par -1/17 UA +rbc\par -1/19 UA prt 100, no rbc\par -8/19 UA  no blood, check repeat\par \par overweight- \par -healthy eating, exercise and  wt loss counseled\par -nutrition referral given\par \par MISC:  Continued social distancing and measure for covid19 prevention encouraged.  \par -hx negative covid19 AB screen 8/20 at  per pt\par \par \par HCM\par -check screening labs; declines HIV/STD screening\par -check PSA screening, risks/benefits discussed\par -flu shot today\par -hx Tdap 2012\par -hx PVX 2012\par -hx prevnar 9/16\par -hx hep B series\par -advised to check on insurance coverage for shingrix and f/u if desires\par -hx screening colonoscopy 8/20: no polyps, internal hemorrhoids, rec: repeat in 10 yrs (Dr. Ghosh)\par -yearly derm screening exam advised.  Regular use of sun block for skin cancer prevention counseled\par -yearly dental screening advised, referral given per request\par -advised to designate HCP and provide copy of completed form for records\par -cont'd smoking cessation encouraged\par \par \par Pt's cell: [phone]\par Pt requests wife, Jana be called if unable to reach him re; his medical care.  Her cell: [phone]

## 2020-09-21 NOTE — PHYSICAL EXAM
[General Appearance - Alert] : alert [General Appearance - In No Acute Distress] : in no acute distress [General Appearance - Well-Appearing] : healthy appearing [Sclera] : the sclera and conjunctiva were normal [PERRL With Normal Accommodation] : pupils were equal in size, round, and reactive to light [Extraocular Movements] : extraocular movements were intact [Outer Ear] : the ears and nose were normal in appearance [Nasal Cavity] : the nasal mucosa and septum were normal [Oropharynx] : the oropharynx was normal [Neck Appearance] : the appearance of the neck was normal [Thyroid Diffuse Enlargement] : the thyroid was not enlarged [Respiration, Rhythm And Depth] : normal respiratory rhythm and effort [Auscultation Breath Sounds / Voice Sounds] : lungs were clear to auscultation bilaterally [Heart Rate And Rhythm] : heart rate was normal and rhythm regular [Heart Sounds] : normal S1 and S2 [Murmurs] : no murmurs [Full Pulse] : the pedal pulses are present [Edema] : there was no peripheral edema [Bowel Sounds] : normal bowel sounds [Abdomen Soft] : soft [Abdomen Tenderness] : non-tender [Cervical Lymph Nodes Enlarged Posterior Bilaterally] : posterior cervical [Cervical Lymph Nodes Enlarged Anterior Bilaterally] : anterior cervical [Supraclavicular Lymph Nodes Enlarged Bilaterally] : supraclavicular [No CVA Tenderness] : no ~M costovertebral angle tenderness [No Spinal Tenderness] : no spinal tenderness [Abnormal Walk] : normal gait [Musculoskeletal - Swelling] : no joint swelling seen [Oriented To Time, Place, And Person] : oriented to person, place, and time [Affect] : the affect was normal [Mood] : the mood was normal [Declined Rectal Exam] : declined rectal exam [Comprehensive Foot Exam Normal] : Right and left foot were examined and both feet are normal. No ulcers in either foot. Toes are normal and with full ROM.  Normal tactile sensation with monofilament testing throughout both feet [Both Tympanic Membranes Were Examined] : both tympanic membranes were normal [] : no hepato-splenomegaly [de-identified] : declined by pt [FreeTextEntry1] : minimal right facial droop (improved per pt)

## 2020-09-25 LAB
25(OH)D3 SERPL-MCNC: 54.5 NG/ML
ALBUMIN SERPL ELPH-MCNC: 4.2 G/DL
ALP BLD-CCNC: 101 U/L
ALT SERPL-CCNC: 21 U/L
ANION GAP SERPL CALC-SCNC: 12 MMOL/L
APPEARANCE: CLEAR
AST SERPL-CCNC: 18 U/L
BACTERIA: NEGATIVE
BASOPHILS # BLD AUTO: 0.05 K/UL
BASOPHILS NFR BLD AUTO: 0.6 %
BILIRUB SERPL-MCNC: 0.4 MG/DL
BILIRUBIN URINE: NEGATIVE
BLOOD URINE: NEGATIVE
BUN SERPL-MCNC: 22 MG/DL
CALCIUM SERPL-MCNC: 9.5 MG/DL
CHLORIDE SERPL-SCNC: 103 MMOL/L
CHOLEST SERPL-MCNC: 227 MG/DL
CHOLEST/HDLC SERPL: 5.7 RATIO
CO2 SERPL-SCNC: 24 MMOL/L
COLOR: YELLOW
CREAT SERPL-MCNC: 1.03 MG/DL
CREAT SPEC-SCNC: 186 MG/DL
CREAT SPEC-SCNC: 186 MG/DL
CREAT/PROT UR: 1.2 RATIO
EOSINOPHIL # BLD AUTO: 0.18 K/UL
EOSINOPHIL NFR BLD AUTO: 2.2 %
ESTIMATED AVERAGE GLUCOSE: 180 MG/DL
FOLATE SERPL-MCNC: 16.7 NG/ML
GLUCOSE QUALITATIVE U: NEGATIVE
GLUCOSE SERPL-MCNC: 97 MG/DL
HBA1C MFR BLD HPLC: 7.9 %
HCT VFR BLD CALC: 41.6 %
HDLC SERPL-MCNC: 40 MG/DL
HGB BLD-MCNC: 13.7 G/DL
HYALINE CASTS: 3 /LPF
IMM GRANULOCYTES NFR BLD AUTO: 0.1 %
KETONES URINE: NEGATIVE
LDLC SERPL CALC-MCNC: 114 MG/DL
LEUKOCYTE ESTERASE URINE: NEGATIVE
LYMPHOCYTES # BLD AUTO: 2.92 K/UL
LYMPHOCYTES NFR BLD AUTO: 35.7 %
MAN DIFF?: NORMAL
MCHC RBC-ENTMCNC: 28.8 PG
MCHC RBC-ENTMCNC: 32.9 GM/DL
MCV RBC AUTO: 87.6 FL
MICROALBUMIN 24H UR DL<=1MG/L-MCNC: 107.4 MG/DL
MICROALBUMIN/CREAT 24H UR-RTO: 577 MG/G
MICROSCOPIC-UA: NORMAL
MONOCYTES # BLD AUTO: 0.54 K/UL
MONOCYTES NFR BLD AUTO: 6.6 %
NEUTROPHILS # BLD AUTO: 4.47 K/UL
NEUTROPHILS NFR BLD AUTO: 54.8 %
NITRITE URINE: NEGATIVE
PH URINE: 6
PLATELET # BLD AUTO: 201 K/UL
POTASSIUM SERPL-SCNC: 4.6 MMOL/L
PROT SERPL-MCNC: 7.6 G/DL
PROT UR-MCNC: 213 MG/DL
PROTEIN URINE: ABNORMAL
PSA SERPL-MCNC: 0.99 NG/ML
RBC # BLD: 4.75 M/UL
RBC # FLD: 14.6 %
RED BLOOD CELLS URINE: 3 /HPF
SODIUM SERPL-SCNC: 139 MMOL/L
SPECIFIC GRAVITY URINE: 1.03
SQUAMOUS EPITHELIAL CELLS: 0 /HPF
TRIGL SERPL-MCNC: 365 MG/DL
TSH SERPL-ACNC: 1.94 UIU/ML
UROBILINOGEN URINE: ABNORMAL
VIT B12 SERPL-MCNC: 1666 PG/ML
WBC # FLD AUTO: 8.17 K/UL
WHITE BLOOD CELLS URINE: 1 /HPF

## 2020-10-12 ENCOUNTER — NON-APPOINTMENT (OUTPATIENT)
Age: 60
End: 2020-10-12

## 2020-10-12 ENCOUNTER — APPOINTMENT (OUTPATIENT)
Dept: CARDIOLOGY | Facility: CLINIC | Age: 60
End: 2020-10-12
Payer: MEDICAID

## 2020-10-12 VITALS
TEMPERATURE: 96.5 F | DIASTOLIC BLOOD PRESSURE: 84 MMHG | OXYGEN SATURATION: 100 % | HEIGHT: 66 IN | BODY MASS INDEX: 29.05 KG/M2 | SYSTOLIC BLOOD PRESSURE: 150 MMHG | HEART RATE: 53 BPM

## 2020-10-12 VITALS — WEIGHT: 180 LBS | BODY MASS INDEX: 29.05 KG/M2

## 2020-10-12 PROCEDURE — 93000 ELECTROCARDIOGRAM COMPLETE: CPT

## 2020-10-12 PROCEDURE — 99213 OFFICE O/P EST LOW 20 MIN: CPT

## 2020-10-12 NOTE — DISCUSSION/SUMMARY
[Cardiomyopathy] : cardiomyopathy [Coronary Artery Disease] : coronary artery disease [Possible Cardiac Ischemia (Intermd Prob)] : possible cardiac ischemia (intermediate probability) [Stable] : stable [Exercise Stress Test] : exercise stress test [Nuclear Imaging] : nuclear imaging [SPECT Imaging] : SPECT imaging [Medication Changes Per Orders] : as documented in orders [Dietary Modification] : dietary modification [Weight Reduction] : weight reduction [Essential Hypertension] : essential hypertension [Echocardiogram] : echocardiogram [Exercise Regimen] : an exercise regimen [Weight Loss] : weight loss [Sodium Restriction] : sodium restriction [Peripheral Vascular Disease] : peripheral vascular disease [Deteriorating] : deteriorating [CLARY] : an ankle-brachial index [Segmental Limb Pressures] : segmental limb pressures [Patient] : the patient [Risks] : risks [Benefits] : benefits [Alternatives] : alternatives [___ Week(s)] : [unfilled] week(s) [With Me] : with me [FreeTextEntry1] : -Pt agrees for evaluation with exercise nuc stress test, TTE and CLARY/PVR\par -Recommended TLC incl exercise for 30 mins 5x/week\par

## 2020-10-12 NOTE — HISTORY OF PRESENT ILLNESS
[FreeTextEntry1] : 60-year-old M with a PMH of CAD S/P 4-vessel CABG (12/2011), mild-moderate LV dysfunction, HTN, dyslipidemia and diabetes following up in cardiology clinic -no recent stress test or TTE. Reports poor exercise tolerance-but can walk up 2 flights of stairs slowly. Hasnot had any recent evaluation of LV function. His Hba1c and lipid profile have been elevated in last visit 09/25/2020\par -\par -TC-227\par -LDL-114\par -HDL-40\par EKG largely similar to prior w/ Q-waves in inf leads and TWI in anterior leads. Detailed discussion had w/ pt-noting worsening of risk factors and no recent cardiac evaluation in setting of limited exercise capacity in diabetic male with CAD-will recommend exercise nuclear stress test, TTE and an CLARY/PVR-pt agrees w/o obvious barrier to comprehension.\par

## 2020-10-12 NOTE — REVIEW OF SYSTEMS
[Dyspnea on exertion] : dyspnea during exertion [Leg Claudication] : intermittent leg claudication [Fever] : no fever [Chills] : no chills [Blurry Vision] : no blurred vision [Eyeglasses] : not currently wearing eyeglasses [Shortness Of Breath] : no shortness of breath [Chest  Pressure] : no chest pressure [Chest Pain] : no chest pain [Lower Ext Edema] : no extremity edema [Palpitations] : no palpitations [Cough] : no cough [Abdominal Pain] : no abdominal pain [Heartburn] : no heartburn [Urinary Frequency] : no change in urinary frequency [Joint Pain] : no joint pain [Muscle Cramps] : no muscle cramps [Skin: A Rash] : no rash: [Dizziness] : no dizziness [Convulsions] : no convulsions [Confusion] : no confusion was observed [Excessive Thirst] : no polydipsia [Easy Bleeding] : no tendency for easy bleeding [Easy Bruising] : no tendency for easy bruising

## 2020-10-12 NOTE — REASON FOR VISIT
[Follow-Up - Clinic] : a clinic follow-up of [Coronary Artery Disease] : coronary artery disease [CABG Follow-up] : bypass graft [Heart Failure] : congestive heart failure

## 2020-10-12 NOTE — PHYSICAL EXAM
[General Appearance - Well Developed] : well developed [General Appearance - Well Nourished] : well nourished [Normal Conjunctiva] : the conjunctiva exhibited no abnormalities [Normal Jugular Venous V Waves Present] : normal jugular venous V waves present [Respiration, Rhythm And Depth] : normal respiratory rhythm and effort [Exaggerated Use Of Accessory Muscles For Inspiration] : no accessory muscle use [Auscultation Breath Sounds / Voice Sounds] : lungs were clear to auscultation bilaterally [Heart Sounds] : normal S1 and S2 [Edema] : no peripheral edema present [Systolic grade ___/6] : A grade [unfilled]/6 systolic murmur was heard. [0] : left 0 [2+] : right 2+ [1+] : left 1+ [Bowel Sounds] : normal bowel sounds [Abdomen Soft] : soft [Abnormal Walk] : normal gait [Nail Clubbing] : no clubbing of the fingernails [Cyanosis, Localized] : no localized cyanosis [Skin Turgor] : normal skin turgor [] : no rash [Oriented To Time, Place, And Person] : oriented to person, place, and time [Impaired Insight] : insight and judgment were intact

## 2020-10-12 NOTE — ASSESSMENT
[FreeTextEntry1] : 60/M with CAD s/p CABG and mod LV dysfn in past now presents with worsening CV risk factors incl elevated BP, HBA1c and cholesterols-and possible worsening exertional dyspnea.

## 2020-10-20 RX ORDER — DAPAGLIFLOZIN 5 MG/1
5 TABLET, FILM COATED ORAL DAILY
Qty: 90 | Refills: 1 | Status: DISCONTINUED | COMMUNITY
Start: 2020-10-12 | End: 2020-10-20

## 2020-12-22 ENCOUNTER — APPOINTMENT (OUTPATIENT)
Dept: INTERNAL MEDICINE | Facility: CLINIC | Age: 60
End: 2020-12-22

## 2021-02-01 ENCOUNTER — RX RENEWAL (OUTPATIENT)
Age: 61
End: 2021-02-01

## 2021-02-05 ENCOUNTER — RX RENEWAL (OUTPATIENT)
Age: 61
End: 2021-02-05

## 2021-03-08 ENCOUNTER — APPOINTMENT (OUTPATIENT)
Dept: INTERNAL MEDICINE | Facility: CLINIC | Age: 61
End: 2021-03-08
Payer: MEDICAID

## 2021-03-08 ENCOUNTER — RX RENEWAL (OUTPATIENT)
Age: 61
End: 2021-03-08

## 2021-03-08 VITALS
WEIGHT: 165 LBS | DIASTOLIC BLOOD PRESSURE: 82 MMHG | BODY MASS INDEX: 26.63 KG/M2 | SYSTOLIC BLOOD PRESSURE: 168 MMHG | HEART RATE: 61 BPM | OXYGEN SATURATION: 98 % | TEMPERATURE: 97.4 F

## 2021-03-08 VITALS — DIASTOLIC BLOOD PRESSURE: 72 MMHG | SYSTOLIC BLOOD PRESSURE: 152 MMHG

## 2021-03-08 PROCEDURE — 99214 OFFICE O/P EST MOD 30 MIN: CPT

## 2021-03-08 PROCEDURE — 99072 ADDL SUPL MATRL&STAF TM PHE: CPT

## 2021-03-08 RX ORDER — POLYETHYLENE GLYCOL-3350, SODIUM CHLORIDE, POTASSIUM CHLORIDE AND SODIUM BICARBONATE 420; 11.2; 5.72; 1.48 G/438.4G; G/438.4G; G/438.4G; G/438.4G
420 POWDER, FOR SOLUTION ORAL
Qty: 1 | Refills: 0 | Status: DISCONTINUED | COMMUNITY
Start: 2020-03-10 | End: 2021-03-08

## 2021-03-08 NOTE — HISTORY OF PRESENT ILLNESS
[de-identified] : Presents for follow up of chronic conditions - hypertension, diabetes, CAD, CHD, hyperlipidemia.\par \par Needs referral for opthalmology.  Note some blurred vision.  Was not taking his medication as prescribed, but has been adherent for about the past month, except for the amlodipine.  Blurred vision has resolved at this times.  \par   \par Gets some burning in his feet, denies any numbness, does regular foot checks on himself.  Would like to follow up with podiatry. \par \par Denies any edema.   No chest pain, no dyspnea.  \par Did not do testing recommended by cardiology because of the pandemic, thought he would need be able to scheduled those tests.  \par

## 2021-03-08 NOTE — PHYSICAL EXAM
[No Carotid Bruits] : no carotid bruits [Pedal Pulses Present] : the pedal pulses are present [No Edema] : there was no peripheral edema [No Extremity Clubbing/Cyanosis] : no extremity clubbing/cyanosis [Normal] : normal gait, coordination grossly intact, no focal deficits and deep tendon reflexes were 2+ and symmetric [Comprehensive Foot Exam Normal] : Right and left foot were examined and both feet are normal. No ulcers in either foot. Toes are normal and with full ROM.  Normal tactile sensation with monofilament testing throughout both feet

## 2021-03-08 NOTE — ASSESSMENT
[FreeTextEntry1] : Diabetes:\par Reports nonadherence with his medication for a period of time, has been taking it regularly for about the past month\par Will check A1C with labs today \par PCP previously recommended endo eval, again encouarged, req reprinted today \par \par Hypertension:\par Elevated, reports not taking the amlodipine\par Will restart, return for BP check in 2 weeks\par \par CAD, CHF:\par Cardiology follow up 5 months ago, hypertensive at that visit as well.  Was advised to get follow up testing - stress test, echo, CLARY - but has not done yet, reprinted reqs today.  \par Denies chest pain, palpitations, dyspnea with exertion, edema\par Schedule cardiology follow up\par \par Hyperlipidemia:\par Reports nonadherence with medication for a period of time, has been better for about the past month\par Check lipids with labs \par \par Proteinuria:\par Microalbumin remains elevated, last done with CPE 7 months ago\par Check BMP with labs today \par Discussed importance of DM and BP control\par Patient indicates understanding and adherence to medication at this time\par Will scheduled nephrology follow up\par \par Blurred vision:\par Reports resolved with medication adherence\par Due for opthalmology follow up, retinal exam - h/o htn, dm\par

## 2021-03-09 LAB
ANION GAP SERPL CALC-SCNC: 11 MMOL/L
BUN SERPL-MCNC: 23 MG/DL
CALCIUM SERPL-MCNC: 9.5 MG/DL
CHLORIDE SERPL-SCNC: 104 MMOL/L
CHOLEST SERPL-MCNC: 175 MG/DL
CO2 SERPL-SCNC: 24 MMOL/L
CREAT SERPL-MCNC: 1.08 MG/DL
ESTIMATED AVERAGE GLUCOSE: 214 MG/DL
GLUCOSE SERPL-MCNC: 117 MG/DL
HBA1C MFR BLD HPLC: 9.1 %
HDLC SERPL-MCNC: 36 MG/DL
LDLC SERPL CALC-MCNC: 94 MG/DL
NONHDLC SERPL-MCNC: 140 MG/DL
POTASSIUM SERPL-SCNC: 4.5 MMOL/L
SODIUM SERPL-SCNC: 139 MMOL/L
TRIGL SERPL-MCNC: 231 MG/DL

## 2021-03-16 ENCOUNTER — APPOINTMENT (OUTPATIENT)
Dept: NEPHROLOGY | Facility: CLINIC | Age: 61
End: 2021-03-16
Payer: MEDICAID

## 2021-03-16 PROCEDURE — 99442: CPT

## 2021-03-17 NOTE — ASSESSMENT
[FreeTextEntry1] : Proteinuria- likely sec to dm. serological work up negative. on losartan 100 mg daily. continue. proteinuria was 0.9 gm/gm. need to repeat urine protein/creat ratio that he will do at his PCP's office. if still elevated, will start SGLT2i. \par \par HTN- BP is intermittently high. asked to record BP consistently at home and take the readings to his PCP \par \par \par \par

## 2021-03-17 NOTE — REASON FOR VISIT
[Home] : at home, [unfilled] , at the time of the visit. [Other Location: e.g. Home (Enter Location, City,State)___] : at [unfilled] [Follow-Up] : a follow-up visit [Verbal consent obtained from patient] : the patient, [unfilled]

## 2021-03-17 NOTE — HISTORY OF PRESENT ILLNESS
[FreeTextEntry1] : 61 yo male with CAD s/p CABG, HTN, HLD, Recent admission to [facility] in Dec 2019 with right Bell's palsy s/p prednisone/ Valtrex, DM ( was uncontrolled but recently better, no retinopathy) referred for proteinuria. Urine protein/creat ratio - 0.9 gm/gm in Jan 2020. serum creat 0.9 mg/dl with an eGFR of 93 ml/min. no hematuria\par \par no nsaids \par no recent fever/ infection \par no antibiotics \par \par serological work up done that was negative. A renal ultrasound done showed right kidney- 9.8 cm/ mildly increased cortical echogenicity. LEFT kidney- 9.4 cm/ mildly increased echogenicity. no hydronephrosis. \par \par Since last visit, he has been doing well. denies any complaints.\par last creat 1.08 mg/dl on 3/8/2021 \par \par ros\par no changes in urination, no blood in urine\par \par \par

## 2021-03-18 ENCOUNTER — RX RENEWAL (OUTPATIENT)
Age: 61
End: 2021-03-18

## 2021-03-22 ENCOUNTER — NON-APPOINTMENT (OUTPATIENT)
Age: 61
End: 2021-03-22

## 2021-03-22 ENCOUNTER — RX RENEWAL (OUTPATIENT)
Age: 61
End: 2021-03-22

## 2021-03-22 ENCOUNTER — APPOINTMENT (OUTPATIENT)
Dept: CARDIOLOGY | Facility: CLINIC | Age: 61
End: 2021-03-22
Payer: MEDICAID

## 2021-03-22 VITALS
HEIGHT: 66 IN | TEMPERATURE: 94.6 F | WEIGHT: 165 LBS | RESPIRATION RATE: 16 BRPM | HEART RATE: 55 BPM | SYSTOLIC BLOOD PRESSURE: 130 MMHG | OXYGEN SATURATION: 98 % | DIASTOLIC BLOOD PRESSURE: 76 MMHG | BODY MASS INDEX: 26.52 KG/M2

## 2021-03-22 PROCEDURE — 93000 ELECTROCARDIOGRAM COMPLETE: CPT

## 2021-03-22 PROCEDURE — 99213 OFFICE O/P EST LOW 20 MIN: CPT

## 2021-03-22 PROCEDURE — 99072 ADDL SUPL MATRL&STAF TM PHE: CPT

## 2021-03-22 NOTE — PHYSICAL EXAM
[General Appearance - Well Developed] : well developed [Normal Appearance] : normal appearance [General Appearance - Well Nourished] : well nourished [Normal Conjunctiva] : the conjunctiva exhibited no abnormalities [Normal Jugular Venous V Waves Present] : normal jugular venous V waves present [Respiration, Rhythm And Depth] : normal respiratory rhythm and effort [Exaggerated Use Of Accessory Muscles For Inspiration] : no accessory muscle use [Auscultation Breath Sounds / Voice Sounds] : lungs were clear to auscultation bilaterally [Heart Rate And Rhythm] : heart rate and rhythm were normal [Heart Sounds] : normal S1 and S2 [Systolic grade ___/6] : A grade [unfilled]/6 systolic murmur was heard. [2+] : right 2+ [1+] : right 1+ [0] : left 0 [Bowel Sounds] : normal bowel sounds [Abdomen Soft] : soft [Abnormal Walk] : normal gait [Nail Clubbing] : no clubbing of the fingernails [Cyanosis, Localized] : no localized cyanosis [Skin Color & Pigmentation] : normal skin color and pigmentation [Skin Turgor] : normal skin turgor [] : no rash [Oriented To Time, Place, And Person] : oriented to person, place, and time [Impaired Insight] : insight and judgment were intact [No Anxiety] : not feeling anxious

## 2021-03-22 NOTE — HISTORY OF PRESENT ILLNESS
[FreeTextEntry1] : Pt with CABG and DM2 seen last on Oct 12, 2021 by me-and requested by PCP for obtaining the stress test, TTE and CLARY/PVR. He denies any CP today-but does have exertional dyspnea and 'burning pain' B/L LE.\par

## 2021-03-22 NOTE — DISCUSSION/SUMMARY
[Bundle Branch Block] : ~T bundle branch block [Stable] : stable [Stress Test Treadmill] : an exercise treadmill test [Isotope Perfusion Sestamibi] : cardiac perfusion imaging with sestamibi [Medication Changes Per Orders] : Medication changes are as documented in orders [Patient] : the patient [Risks] : risks [Benefits] : benefits [Alternatives] : alternatives [___ Week(s)] : [unfilled] week(s) [With Me] : with me [FreeTextEntry1] : # Pt with DM2/CABG with exertional dyspnea\par -Exercise nuc stress\par -TTE\par \par # DM2/CABG w/ diminished LE pulses\par -CLARY/PVR\par -Recommended TLC incl exercise for 30 mins 5x/week\par \par #HTN\par -BP at goal <140/90 mm Hg today

## 2021-03-22 NOTE — REVIEW OF SYSTEMS
[Dyspnea on exertion] : dyspnea during exertion [Chest  Pressure] : chest pressure [Leg Claudication] : intermittent leg claudication [Fever] : no fever [Chills] : no chills [Blurry Vision] : no blurred vision [Earache] : no earache [Shortness Of Breath] : no shortness of breath [Chest Pain] : no chest pain [Lower Ext Edema] : no extremity edema [Palpitations] : no palpitations [Cough] : no cough [Abdominal Pain] : no abdominal pain [Heartburn] : no heartburn [Dysphagia] : no dysphagia [Urinary Frequency] : no change in urinary frequency [Joint Pain] : no joint pain [Skin: A Rash] : no rash: [Dizziness] : no dizziness [Confusion] : no confusion was observed [Excessive Thirst] : no polydipsia [Easy Bleeding] : no tendency for easy bleeding [Easy Bruising] : no tendency for easy bruising

## 2021-03-23 ENCOUNTER — OUTPATIENT (OUTPATIENT)
Dept: OUTPATIENT SERVICES | Facility: HOSPITAL | Age: 61
LOS: 1 days | End: 2021-03-23

## 2021-03-23 ENCOUNTER — APPOINTMENT (OUTPATIENT)
Dept: CV DIAGNOSITCS | Facility: HOSPITAL | Age: 61
End: 2021-03-23
Payer: MEDICAID

## 2021-03-23 ENCOUNTER — APPOINTMENT (OUTPATIENT)
Dept: CV DIAGNOSTICS | Facility: HOSPITAL | Age: 61
End: 2021-03-23
Payer: MEDICAID

## 2021-03-23 DIAGNOSIS — I10 ESSENTIAL (PRIMARY) HYPERTENSION: ICD-10-CM

## 2021-03-23 DIAGNOSIS — E11.9 TYPE 2 DIABETES MELLITUS WITHOUT COMPLICATIONS: ICD-10-CM

## 2021-03-23 DIAGNOSIS — I50.9 HEART FAILURE, UNSPECIFIED: ICD-10-CM

## 2021-03-23 DIAGNOSIS — I25.5 ISCHEMIC CARDIOMYOPATHY: ICD-10-CM

## 2021-03-23 DIAGNOSIS — I25.10 ATHEROSCLEROTIC HEART DISEASE OF NATIVE CORONARY ARTERY WITHOUT ANGINA PECTORIS: ICD-10-CM

## 2021-03-23 PROCEDURE — 93923 UPR/LXTR ART STDY 3+ LVLS: CPT | Mod: 26

## 2021-03-23 PROCEDURE — 93018 CV STRESS TEST I&R ONLY: CPT | Mod: GC

## 2021-03-23 PROCEDURE — 93016 CV STRESS TEST SUPVJ ONLY: CPT | Mod: GC

## 2021-03-23 PROCEDURE — 93306 TTE W/DOPPLER COMPLETE: CPT | Mod: 26

## 2021-03-23 PROCEDURE — 78452 HT MUSCLE IMAGE SPECT MULT: CPT | Mod: 26

## 2021-03-31 ENCOUNTER — RX RENEWAL (OUTPATIENT)
Age: 61
End: 2021-03-31

## 2021-04-02 ENCOUNTER — APPOINTMENT (OUTPATIENT)
Dept: ENDOCRINOLOGY | Facility: CLINIC | Age: 61
End: 2021-04-02
Payer: MEDICAID

## 2021-04-02 VITALS
HEIGHT: 66 IN | BODY MASS INDEX: 26.52 KG/M2 | SYSTOLIC BLOOD PRESSURE: 120 MMHG | OXYGEN SATURATION: 97 % | WEIGHT: 165 LBS | HEART RATE: 47 BPM | DIASTOLIC BLOOD PRESSURE: 80 MMHG

## 2021-04-02 LAB — GLUCOSE BLDC GLUCOMTR-MCNC: 111

## 2021-04-02 PROCEDURE — 36415 COLL VENOUS BLD VENIPUNCTURE: CPT

## 2021-04-02 PROCEDURE — 99072 ADDL SUPL MATRL&STAF TM PHE: CPT

## 2021-04-02 PROCEDURE — 82962 GLUCOSE BLOOD TEST: CPT

## 2021-04-02 PROCEDURE — 99204 OFFICE O/P NEW MOD 45 MIN: CPT | Mod: 25

## 2021-04-04 NOTE — ASSESSMENT
[FreeTextEntry1] : 61 yo M with PMH DM2, HTN, HLD, systolic and diastolic CHF, CAD post CABG, esophagitis.\par \par 1. DM2 - d/c onglyza and glimepiride. increase jardiance to 25 mg qd continue metformin to 1000 mg BID.\par \par 2. HTN - coreg, amlodipine, losartan\par \par 3. HLD - continue atorvastatin

## 2021-04-04 NOTE — HISTORY OF PRESENT ILLNESS
[FreeTextEntry1] : 59 yo M with PMH DM2, HTN, HLD, systolic and diastolic CHF, CAD post CABG, esophagitis\par \par DX 2001\par + neuropathy + nephropathy\par metformin 1000 mg BID, glimepiride 2 mg qd, jardiance 10 mg qd, onglyza 5 mg qd\par B: toast + coffee  \par L: pizza + water v. full plate of rice + roti + daal + water\par D: roti + potato + veg \par last podiatrist visit  - yesterday\par ophthalmologist visit - 2 years ago pending\par snacks on sweets/cakes now q 2 days\par F: 57- 115\par D: 120-  147\par former smoker age 15 - 2010 smoked 1.5 PPD\par \par

## 2021-04-05 ENCOUNTER — APPOINTMENT (OUTPATIENT)
Dept: CARDIOLOGY | Facility: CLINIC | Age: 61
End: 2021-04-05
Payer: MEDICAID

## 2021-04-05 ENCOUNTER — NON-APPOINTMENT (OUTPATIENT)
Age: 61
End: 2021-04-05

## 2021-04-05 VITALS
TEMPERATURE: 96.2 F | SYSTOLIC BLOOD PRESSURE: 163 MMHG | HEART RATE: 49 BPM | OXYGEN SATURATION: 97 % | DIASTOLIC BLOOD PRESSURE: 86 MMHG | RESPIRATION RATE: 16 BRPM | WEIGHT: 165 LBS | BODY MASS INDEX: 26.52 KG/M2 | HEIGHT: 66 IN

## 2021-04-05 PROCEDURE — 99213 OFFICE O/P EST LOW 20 MIN: CPT

## 2021-04-05 PROCEDURE — 99072 ADDL SUPL MATRL&STAF TM PHE: CPT

## 2021-04-05 PROCEDURE — 93000 ELECTROCARDIOGRAM COMPLETE: CPT

## 2021-04-05 NOTE — REVIEW OF SYSTEMS
[Fever] : no fever [Chills] : no chills [Blurry Vision] : no blurred vision [Earache] : no earache [Shortness Of Breath] : no shortness of breath [Dyspnea on exertion] : not dyspnea during exertion [Chest  Pressure] : no chest pressure [Chest Pain] : no chest pain [Lower Ext Edema] : no extremity edema [Leg Claudication] : no intermittent leg claudication [Palpitations] : no palpitations [Cough] : no cough [Abdominal Pain] : no abdominal pain [Heartburn] : no heartburn [Dysphagia] : no dysphagia [Urinary Frequency] : no change in urinary frequency [Joint Pain] : no joint pain [Muscle Cramps] : no muscle cramps [Skin: A Rash] : no rash: [Dizziness] : no dizziness [Confusion] : no confusion was observed [Excessive Thirst] : no polydipsia [Easy Bleeding] : no tendency for easy bleeding

## 2021-04-05 NOTE — PHYSICAL EXAM
[General Appearance - Well Developed] : well developed [Normal Appearance] : normal appearance [General Appearance - Well Nourished] : well nourished [Normal Conjunctiva] : the conjunctiva exhibited no abnormalities [Normal Jugular Venous V Waves Present] : normal jugular venous V waves present [Respiration, Rhythm And Depth] : normal respiratory rhythm and effort [Exaggerated Use Of Accessory Muscles For Inspiration] : no accessory muscle use [Auscultation Breath Sounds / Voice Sounds] : lungs were clear to auscultation bilaterally [Heart Rate And Rhythm] : heart rate and rhythm were normal [Heart Sounds] : normal S1 and S2 [Arterial Pulses Normal] : the arterial pulses were normal [Edema] : no peripheral edema present [Systolic grade ___/6] : A grade [unfilled]/6 systolic murmur was heard. [Bowel Sounds] : normal bowel sounds [Abdomen Tenderness] : non-tender [Abdomen Soft] : soft [Abnormal Walk] : normal gait [Nail Clubbing] : no clubbing of the fingernails [Cyanosis, Localized] : no localized cyanosis [Skin Color & Pigmentation] : normal skin color and pigmentation [Skin Turgor] : normal skin turgor [] : no rash [Oriented To Time, Place, And Person] : oriented to person, place, and time [Impaired Insight] : insight and judgment were intact [No Anxiety] : not feeling anxious

## 2021-04-05 NOTE — DISCUSSION/SUMMARY
[Coronary Artery Disease] : coronary artery disease [Possible Cardiac Ischemia (Intermd Prob)] : possible cardiac ischemia (intermediate probability) [Stable] : stable [Medication Changes Per Orders] : as documented in orders [Dietary Modification] : dietary modification [Exercise Regimen] : an exercise regimen [Weight Reduction] : weight reduction [___ Minutes, >1/2 Counseling] : The visit was [unfilled] minute(s) long, greater than half of the time was spent on counseling [Patient] : the patient [Risks] : risks [Benefits] : benefits [Alternatives] : alternatives [___ Week(s)] : [unfilled] week(s) [With Me] : with me [de-identified] : CCTA [FreeTextEntry1] : # Mildly abnormal stress test with atypical symptoms in diabetic male with known CAD s/p CABG\par - isosorbide started\par -Will obtain CCTA per pt's preference, after detailed discussion about different modalities to assess for CAD/grafts\par \par #HTN\par _Bp not at goal <140/90\par -Pt wants to reduce salt and increase activity and wants to defer meds increase\par -Will re-assess future visit-rec to exercise 30mins/dayX5 every week\par \par #HLD\par -improving lipid profile with lower TG\par -Pt wants to try TLC before considering ezetimibe with already being on atorvastatin 80 mg QHS\par \par

## 2021-04-05 NOTE — HISTORY OF PRESENT ILLNESS
[FreeTextEntry1] : 60/M with CABG in 2011, HLD and DM2 presents after nuc stress, TTE and CLARY/PVR. Has very atypical RIGHT shoulder and neck pain, non-exertional and improves with activity.\par nuc stress 03/23/21-\par IMPRESSIONS:Abnormal Study\par * Myocardial Perfusion SPECT results are abnormal.\par * Review of raw data shows: The study is of adequate\par technical quality with adjacent bowel tracer uptiake\par * The left ventricle was markdely dilated at baseline.\par There are large, severe defects in apical, inferior and\par inferolateral, inferoseptal walls that are predominantly\par fixed, suggestive of infarction with minimal colby-infarct\par apicolateral ischemia.\par * Post-stress gated wall motion analysis was performed\par (LVEF = 45 %;LVEDV = 170 ml.), revealing mild overall\par hypokinesis. There iwas moderate apical inferior and\par inferoseptal hypokinesis and diminished systolic\par thickening. The remaining segments contracted very well,\par thus overall function was only mildly decreased. RV\par function appeared normal.\par \par TTE-\par 1. Mitral annular calcification, otherwise normal mitral\par valve. Mild-moderate mitral regurgitation.\par 2. Normal left ventricular internal dimensions and wall\par thicknesses.\par 3. Mild to moderate segmental left ventricular systolic\par dysfunction.  Hypokinesis of the inferior, lateral, and\par inferolateral walls.\par 4. Moderate diastolic dysfunction (Stage II).\par 5. Normal right ventricular size and function.\par \par CLARY/PVR-normal\par \par Detailed discussion today abt abn stress test-pt with minimal symptoms-wants to intensify meds-and agrees to a CCTA to assess grafts. Discussion had about alternative of invasive angio for assessment-but pt wants to opt for CCTA at this time instead.\par

## 2021-04-10 ENCOUNTER — NON-APPOINTMENT (OUTPATIENT)
Age: 61
End: 2021-04-10

## 2021-04-10 LAB
25(OH)D3 SERPL-MCNC: 33.5 NG/ML
ALBUMIN SERPL ELPH-MCNC: 4.4 G/DL
ALP BLD-CCNC: 96 U/L
ALT SERPL-CCNC: 11 U/L
ANION GAP SERPL CALC-SCNC: 19 MMOL/L
AST SERPL-CCNC: 16 U/L
BILIRUB SERPL-MCNC: 0.3 MG/DL
BUN SERPL-MCNC: 20 MG/DL
C PEPTIDE SERPL-MCNC: 3.8 NG/ML
CALCIUM SERPL-MCNC: 9.6 MG/DL
CHLORIDE SERPL-SCNC: 102 MMOL/L
CO2 SERPL-SCNC: 21 MMOL/L
CREAT SERPL-MCNC: 0.94 MG/DL
GLUCOSE SERPL-MCNC: 87 MG/DL
POTASSIUM SERPL-SCNC: 5 MMOL/L
PROT SERPL-MCNC: 7.8 G/DL
SODIUM SERPL-SCNC: 142 MMOL/L

## 2021-04-27 ENCOUNTER — RX RENEWAL (OUTPATIENT)
Age: 61
End: 2021-04-27

## 2021-05-25 ENCOUNTER — RX RENEWAL (OUTPATIENT)
Age: 61
End: 2021-05-25

## 2021-06-21 ENCOUNTER — APPOINTMENT (OUTPATIENT)
Dept: INTERNAL MEDICINE | Facility: CLINIC | Age: 61
End: 2021-06-21
Payer: MEDICAID

## 2021-06-21 ENCOUNTER — NON-APPOINTMENT (OUTPATIENT)
Age: 61
End: 2021-06-21

## 2021-06-21 VITALS
BODY MASS INDEX: 27.44 KG/M2 | DIASTOLIC BLOOD PRESSURE: 80 MMHG | WEIGHT: 170 LBS | SYSTOLIC BLOOD PRESSURE: 124 MMHG | HEART RATE: 65 BPM | TEMPERATURE: 97.8 F | OXYGEN SATURATION: 97 %

## 2021-06-21 DIAGNOSIS — Z87.898 PERSONAL HISTORY OF OTHER SPECIFIED CONDITIONS: ICD-10-CM

## 2021-06-21 PROCEDURE — 99214 OFFICE O/P EST MOD 30 MIN: CPT

## 2021-06-21 NOTE — PHYSICAL EXAM
[No Carotid Bruits] : no carotid bruits [No Edema] : there was no peripheral edema [Normal] : no CVA or spinal tenderness [Coordination Grossly Intact] : coordination grossly intact [No Focal Deficits] : no focal deficits [Normal Gait] : normal gait

## 2021-06-21 NOTE — HISTORY OF PRESENT ILLNESS
[de-identified] : Presents for follow up of chronic conditions - hypertension, diabetes, CAD, CHD, hyperlipidemia, proteinuria.\par \par Since his last follow up here 3 months ago he did follow up with nephrology, endocrinology, cardiology, ophthalmology, podiatry.  \par \par SGLT2 recommended by nephrology.  Has not been taking Jardiance as he thinks it made him feel unwell.  Will to retry it.  \par \par Recent cardiology follow up, note reviewed.  Has not yet scheduled CTA.  "Mildly abnormal stress test with atypical symptoms in diabetic male with known CAD s/p CABG\par - isosorbide started\par - Will obtain CCTA per pt's preference, after detailed discussion about different modalities to assess for CAD/grafts."\par \par Endo note reviewed - stop onglyza and glimiperide, increased Jardiance to 25, continue metformin.  However no Rx changes made.  Patient understood it as those changes to be made next time.  Has not been taking Jardiance, thinks he had side effects from it, but not sure what.  Willing to retry.  Takes glimepiride and metformin daily.  Takes onglyza every other day.  \par Fasting -190, 151 this morning.  \par \par Feeling overall well.  Active daily - up and down the stairs, walking.  Denies any chest pain, palpitations, dizziness, dyspnea with exertion.  \par \par Reports no retinopathy, has follow up scheduled. \par Feet feel well, provided with diabetic shoes.  \par \par Needs home care forms completed.  \par

## 2021-06-21 NOTE — ASSESSMENT
[FreeTextEntry1] : Diabetes:\par Reports nonadherence with diet.  Also nonadhearance with some meds.  Willing to restart Jardiance.  If does OK with it will plan to increased given his proteinuria and CAD.   \par Will check A1C with labs today \par Endo follow up encouraged. \par Declines CDE eval.  \par Recent ophthalmology and podiatry eval (now has diabetic shoes)\par \par Hypertension:\par Controlled when adherent with medication \par \par CAD, CHF:\par Abnormal nuclear stress test\par Denies symptoms\par Will scheduled CTA as advised by cardiology\par \par Hyperlipidemia:\par Has been adhearant with statin\par Check lipids with labs \par \par Proteinuria:\par SGLT advised by nephrology, patient willing to restart\par Importance of diabetes and BP control discussed\par \par Blurred vision:\par Resolved\par Reports ophthalmology 2 months ago, no retinopathy, has f/u scheduled\par \par Needs TB screen, meals and rubella titers for home care forms.  \par

## 2021-06-24 LAB
M TB IFN-G BLD-IMP: NEGATIVE
MEV IGG FLD QL IA: 128 AU/ML
MEV IGG+IGM SER-IMP: POSITIVE
QUANTIFERON TB PLUS MITOGEN MINUS NIL: 8.77 IU/ML
QUANTIFERON TB PLUS NIL: 0.09 IU/ML
QUANTIFERON TB PLUS TB1 MINUS NIL: 0.08 IU/ML
QUANTIFERON TB PLUS TB2 MINUS NIL: -0.03 IU/ML
RUBV IGG FLD-ACNC: 20.4 INDEX
RUBV IGG SER-IMP: POSITIVE

## 2021-06-25 LAB
ESTIMATED AVERAGE GLUCOSE: 220 MG/DL
HBA1C MFR BLD HPLC: 9.3 %

## 2021-07-06 ENCOUNTER — APPOINTMENT (OUTPATIENT)
Dept: ENDOCRINOLOGY | Facility: CLINIC | Age: 61
End: 2021-07-06

## 2021-08-04 ENCOUNTER — RX RENEWAL (OUTPATIENT)
Age: 61
End: 2021-08-04

## 2021-09-09 ENCOUNTER — APPOINTMENT (OUTPATIENT)
Dept: ENDOCRINOLOGY | Facility: CLINIC | Age: 61
End: 2021-09-09

## 2021-09-13 ENCOUNTER — RX RENEWAL (OUTPATIENT)
Age: 61
End: 2021-09-13

## 2021-09-29 ENCOUNTER — RX RENEWAL (OUTPATIENT)
Age: 61
End: 2021-09-29

## 2021-09-29 ENCOUNTER — APPOINTMENT (OUTPATIENT)
Dept: INTERNAL MEDICINE | Facility: CLINIC | Age: 61
End: 2021-09-29
Payer: MEDICAID

## 2021-09-29 VITALS
HEIGHT: 66 IN | OXYGEN SATURATION: 98 % | SYSTOLIC BLOOD PRESSURE: 150 MMHG | TEMPERATURE: 97.6 F | WEIGHT: 170 LBS | DIASTOLIC BLOOD PRESSURE: 88 MMHG | BODY MASS INDEX: 27.32 KG/M2 | HEART RATE: 74 BPM

## 2021-09-29 VITALS — DIASTOLIC BLOOD PRESSURE: 78 MMHG | SYSTOLIC BLOOD PRESSURE: 138 MMHG

## 2021-09-29 PROCEDURE — 99396 PREV VISIT EST AGE 40-64: CPT | Mod: 25

## 2021-09-29 PROCEDURE — G0008: CPT

## 2021-09-29 PROCEDURE — 90686 IIV4 VACC NO PRSV 0.5 ML IM: CPT

## 2021-09-29 PROCEDURE — 90472 IMMUNIZATION ADMIN EACH ADD: CPT

## 2021-09-29 PROCEDURE — 90750 HZV VACC RECOMBINANT IM: CPT

## 2021-09-29 NOTE — ASSESSMENT
[FreeTextEntry1] : Diabetes:\par Poorly controlled, A1C in June was 9.3.  \par Reports nonadherence with diet.  Also nonadherence with some meds.  \par Discussed restarting Jardiance at last visit and if OK with plan to increase given his proteinuria and CAD.  Did not take the Jardiance.  He does have it at home.  Will take when he gets home and we will speak on the phone before the end of the week to see how he did with it.  \par Endo follow up encouraged. \par Declines CDE eval.  \par Recent ophthalmology (4/2021) and podiatry eval (now has diabetic shoes)\par Recheck A1C today.\par PPSV 2012\par \par Hypertension:\par Controlled when adherent with medication \par \par CAD, CHF:\par Abnormal nuclear stress test\par Denies symptoms\par Has been unable to schedule CTA as advised by cardiology, thinks wrong phone number provided.  I will look into where this is scheduled.  He would like to do it.  \par \par Hyperlipidemia:\par High dose satin, has been adherent\par Check lipids with labs \par \par Proteinuria:\par SGLT advised by nephrology, patient willing to retry Jardiance, thinks had GI upset from it.  \par Importance of diabetes and BP control discussed\par \par Blurred vision:\par Resolved\par Reports ophthalmology 2 months ago, no retinopathy, has f/u scheduled\par \par HM\par UTD colo - 3/2020, repeat 10 years\par UTD dental\par Skin cancer screening advised, done 3/2020, will repeat next year \par UTD tdap - 2012, repeat next year\par Shingrix today, #2 in 2-6 months\par Flu shot today\par COVID vaccine completed in July 2021 - [employer]\par check labs \par \par Will review medication over the phone tomorrow, does not remember all he is taking.  \par

## 2021-09-29 NOTE — HEALTH RISK ASSESSMENT
[No Retinopathy] : No retinopathy [Patient reported colonoscopy was normal] : Patient reported colonoscopy was normal [Good] : ~his/her~  mood as  good [No] : In the past 12 months have you used drugs other than those required for medical reasons? No [No falls in past year] : Patient reported no falls in the past year [0] : 1) Little interest or pleasure doing things: Not at all (0) [PHQ-2 Negative - No further assessment needed] : PHQ-2 Negative - No further assessment needed [With Family] : lives with family [Fully functional (bathing, dressing, toileting, transferring, walking, feeding)] : Fully functional (bathing, dressing, toileting, transferring, walking, feeding) [Fully functional (using the telephone, shopping, preparing meals, housekeeping, doing laundry, using] : Fully functional and needs no help or supervision to perform IADLs (using the telephone, shopping, preparing meals, housekeeping, doing laundry, using transportation, managing medications and managing finances) [Smoke Detector] : smoke detector [Carbon Monoxide Detector] : carbon monoxide detector [With Patient/Caregiver] : , with patient/caregiver [Reviewed no changes] : Reviewed, no changes [Designated Healthcare Proxy] : Designated healthcare proxy [Name: ___] : Health Care Proxy's Name: [unfilled]  [Relationship: ___] : Relationship: [unfilled] [I will adhere to the patient's wishes.] : I will adhere to the patient's wishes. [] : No [de-identified] : walking  [de-identified] : diabetic, low salt, low fat  [[id number]] : 0 [EyeExamDate] : 04/21 [Change in mental status noted] : No change in mental status noted [Reports changes in hearing] : Reports no changes in hearing [Reports changes in vision] : Reports no changes in vision [Reports changes in dental health] : Reports no changes in dental health [ColonoscopyDate] : 3/2020 [AdvancecareDate] : 09/21

## 2021-09-29 NOTE — HISTORY OF PRESENT ILLNESS
[FreeTextEntry1] : CPE/wellness visit  [de-identified] : 61 y.o. male, PMHx hypertension, diabetes, CAD, CHD, hyperlipidemia, proteinuria.\par \par SGLT2 recommended by nephrology.  Stopped Jardiance as he thinks it caused GI upset.  Discussed retrying at last visit, has not done so, continues other diabetic meds.  \par \par Recent cardiology follow up, note reviewed.  Has not yet scheduled CTA, tried to, but the phone numbers he was provided with did not allow him to schedule.  "Mildly abnormal stress test with atypical symptoms in diabetic male with known CAD s/p CABG\par - isosorbide started\par - Will obtain CCTA per pt's preference, after detailed discussion about different modalities to assess for CAD/grafts."\par \par Endo note reviewed - stop onglyza and glimiperide, increased Jardiance to 25, continue metformin.  However no Rx changes made.  Patient understood it as those changes to be made next time.  Has not been taking Jardiance, thinks he had GI upset from it.  Willing to retry.  Takes glimepiride and metformin daily.  Takes onglyza every other day.  \par Fasting -190, 151 this morning.  \par \par Feeling overall well.  Active daily - up and down the stairs, walking.  Denies any chest pain, palpitations, dizziness, dyspnea with exertion.  \par \par Reports no retinopathy, has follow up scheduled. \par Feet feel well, provided with diabetic shoes.  \par

## 2021-10-05 LAB
ALBUMIN SERPL ELPH-MCNC: 4.1 G/DL
ALP BLD-CCNC: 114 U/L
ALT SERPL-CCNC: 14 U/L
ANION GAP SERPL CALC-SCNC: 15 MMOL/L
AST SERPL-CCNC: 14 U/L
BASOPHILS # BLD AUTO: 0.04 K/UL
BASOPHILS NFR BLD AUTO: 0.7 %
BILIRUB SERPL-MCNC: 0.3 MG/DL
BUN SERPL-MCNC: 14 MG/DL
CALCIUM SERPL-MCNC: 9.6 MG/DL
CHLORIDE SERPL-SCNC: 101 MMOL/L
CHOLEST SERPL-MCNC: 377 MG/DL
CO2 SERPL-SCNC: 22 MMOL/L
CREAT SERPL-MCNC: 0.89 MG/DL
CREAT SPEC-SCNC: 79 MG/DL
EOSINOPHIL # BLD AUTO: 0.1 K/UL
EOSINOPHIL NFR BLD AUTO: 1.7 %
ESTIMATED AVERAGE GLUCOSE: 266 MG/DL
FOLATE SERPL-MCNC: 17.9 NG/ML
GLUCOSE SERPL-MCNC: 91 MG/DL
HBA1C MFR BLD HPLC: 10.9 %
HCT VFR BLD CALC: 44.9 %
HDLC SERPL-MCNC: 39 MG/DL
HGB BLD-MCNC: 15 G/DL
IMM GRANULOCYTES NFR BLD AUTO: 0.3 %
LDLC SERPL CALC-MCNC: NORMAL MG/DL
LYMPHOCYTES # BLD AUTO: 2.34 K/UL
LYMPHOCYTES NFR BLD AUTO: 40.1 %
MAN DIFF?: NORMAL
MCHC RBC-ENTMCNC: 29.4 PG
MCHC RBC-ENTMCNC: 33.4 GM/DL
MCV RBC AUTO: 87.9 FL
MICROALBUMIN 24H UR DL<=1MG/L-MCNC: 139.7 MG/DL
MICROALBUMIN/CREAT 24H UR-RTO: 1770 MG/G
MONOCYTES # BLD AUTO: 0.45 K/UL
MONOCYTES NFR BLD AUTO: 7.7 %
NEUTROPHILS # BLD AUTO: 2.88 K/UL
NEUTROPHILS NFR BLD AUTO: 49.5 %
NONHDLC SERPL-MCNC: 338 MG/DL
PLATELET # BLD AUTO: 222 K/UL
POTASSIUM SERPL-SCNC: 4.8 MMOL/L
PROT SERPL-MCNC: 8.2 G/DL
PSA SERPL-MCNC: 1.25 NG/ML
RBC # BLD: 5.11 M/UL
RBC # FLD: 13.7 %
SODIUM SERPL-SCNC: 138 MMOL/L
TRIGL SERPL-MCNC: 606 MG/DL
VIT B12 SERPL-MCNC: 498 PG/ML
WBC # FLD AUTO: 5.83 K/UL

## 2021-12-06 ENCOUNTER — RX RENEWAL (OUTPATIENT)
Age: 61
End: 2021-12-06

## 2021-12-20 ENCOUNTER — APPOINTMENT (OUTPATIENT)
Dept: INTERNAL MEDICINE | Facility: CLINIC | Age: 61
End: 2021-12-20

## 2022-03-11 ENCOUNTER — RX RENEWAL (OUTPATIENT)
Age: 62
End: 2022-03-11

## 2022-03-17 ENCOUNTER — APPOINTMENT (OUTPATIENT)
Dept: INTERNAL MEDICINE | Facility: CLINIC | Age: 62
End: 2022-03-17
Payer: MEDICAID

## 2022-03-17 VITALS
SYSTOLIC BLOOD PRESSURE: 118 MMHG | HEART RATE: 81 BPM | OXYGEN SATURATION: 96 % | DIASTOLIC BLOOD PRESSURE: 86 MMHG | TEMPERATURE: 97.6 F | BODY MASS INDEX: 26.79 KG/M2 | WEIGHT: 166 LBS

## 2022-03-17 DIAGNOSIS — Z86.69 PERSONAL HISTORY OF OTHER DISEASES OF THE NERVOUS SYSTEM AND SENSE ORGANS: ICD-10-CM

## 2022-03-17 DIAGNOSIS — Z87.09 PERSONAL HISTORY OF OTHER DISEASES OF THE RESPIRATORY SYSTEM: ICD-10-CM

## 2022-03-17 DIAGNOSIS — R94.39 ABNORMAL RESULT OF OTHER CARDIOVASCULAR FUNCTION STUDY: ICD-10-CM

## 2022-03-17 DIAGNOSIS — R19.5 OTHER FECAL ABNORMALITIES: ICD-10-CM

## 2022-03-17 PROCEDURE — 90750 HZV VACC RECOMBINANT IM: CPT

## 2022-03-17 PROCEDURE — 99214 OFFICE O/P EST MOD 30 MIN: CPT | Mod: 25

## 2022-03-17 PROCEDURE — 90471 IMMUNIZATION ADMIN: CPT

## 2022-03-17 NOTE — HEALTH RISK ASSESSMENT
[Former] : Former [0] : 2) Feeling down, depressed, or hopeless: Not at all (0) [PHQ-2 Negative - No further assessment needed] : PHQ-2 Negative - No further assessment needed [No Retinopathy] : No retinopathy [de-identified] : quit about 10 year ago  [[id number]] : 0 [EyeExamDate] : 02/22

## 2022-03-17 NOTE — ASSESSMENT
[FreeTextEntry1] : Diabetes:\par Has been poorly controlled, A1C in September >10.  Throughout last year A1C >9.  \par Reports recent improvement with BG, has been checking regularly at home, .\par Taking medication as prescribed for about the past 2 months, including statin.     \par Also reports dietary improvement.  \par Declines CDE eval.  \par Recent ophthalmology (2/2022), reports normal.  \par Scheduled with podiatry - reports neuropathy now resolved with improved BG. \par Recheck A1C today, lipids, albuminuria, bmp.\par PPSV 2012\par \par Hypertension:\par Controlled when adherent with medication \par \par CAD, CHF:\par Abnormal nuclear stress test\par Denies symptoms\par Has been unable to schedule CTA as advised by cardiology, still unable to schedule.  Advised to reach out to cardiology office with assistance and schedule f/u with Dr. Grimaldo.  \par \par Hyperlipidemia:\par High dose satin, but nonadherent in the past, reports taking regularly now and reports some improvement in diet and physical activity.  \par Check lipids with labs \par \par Proteinuria:\par SGLT advised by nephrology, now reports adherence with Jardiance without side effects.  \par Importance of diabetes and BP control discussed\par Recheck albuminuria and bmp today\par \par Blurred vision:\par Resolved\par Reports regular ophthalomology f/u \par \par HM\par UTD colo - 3/2020, repeat 10 years\par UTD dental\par Skin cancer screening advised, done 3/2020, will repeat next year \par UTD tdap - 2012, repeat next visit, \par Shingrix completed today, #2 \par Flu shot done\par COVID vaccine done with booster\par \par

## 2022-03-17 NOTE — REVIEW OF SYSTEMS
[Fever] : no fever [Chills] : no chills [Fatigue] : no fatigue [Night Sweats] : no night sweats [Discharge] : no discharge [Pain] : no pain [Vision Problems] : no vision problems [Chest Pain] : no chest pain [Palpitations] : no palpitations [Lower Ext Edema] : no lower extremity edema [Orthopena] : no orthopnea [Paroxysmal Nocturnal Dyspnea] : no paroxysmal nocturnal dyspnea [Cough] : no cough [Dyspnea on Exertion] : not dyspnea on exertion [Abdominal Pain] : no abdominal pain [Nausea] : no nausea [Constipation] : no constipation [Diarrhea] : no diarrhea [Vomiting] : no vomiting [Heartburn] : no heartburn [Dysuria] : no dysuria [Incontinence] : no incontinence [Frequency] : no frequency [Skin Rash] : no skin rash [Headache] : no headache [Dizziness] : no dizziness [Fainting] : no fainting [Confusion] : no confusion [Memory Loss] : no memory loss [Negative] : Heme/Lymph [FreeTextEntry2] : modest weight loss with dietary changes and increased physical activity

## 2022-03-17 NOTE — PHYSICAL EXAM
[No Acute Distress] : no acute distress [Well-Appearing] : well-appearing [Normal Voice/Communication] : normal voice/communication [No Carotid Bruits] : no carotid bruits [No Varicosities] : no varicosities [Pedal Pulses Present] : the pedal pulses are present [No Edema] : there was no peripheral edema [No Extremity Clubbing/Cyanosis] : no extremity clubbing/cyanosis [Coordination Grossly Intact] : coordination grossly intact [No Focal Deficits] : no focal deficits [Normal Gait] : normal gait [Normal] : affect was normal and insight and judgment were intact

## 2022-03-17 NOTE — HISTORY OF PRESENT ILLNESS
[de-identified] : 61 y.o. male, PMHx hypertension, diabetes, CAD (s/p quadruple CABG ~10 yr, ago), CHD, hyperlipidemia, proteinuria.  Presents for follow up.  \par \par Diabetes had been poorly controlled d/t medication and dietary noncompliance.  \par Lipids also poorly controlled d/t medication nonadherence and dietary factors.  \par Has been taking medication as prescribed for about the past 2 months.  BG numbers in the last month less than 120.  \par Reports was not taking medication prior d/t side effects, but now taking with food and tolerating well. \par Eating much better. \par Neuropathy gone since BG improved.  Blurred vision gone.  Ophthalmology f/u last month.  \par No formal exercise, but reports being active most days, works as a [employer] .  \par \par Never any chest pain, no dyspnea with exertion, no edema, no dizziness.  \par Never able to get cardiac CT scheduled.  \par

## 2022-03-18 LAB
ANION GAP SERPL CALC-SCNC: 12 MMOL/L
BUN SERPL-MCNC: 20 MG/DL
CALCIUM SERPL-MCNC: 9.7 MG/DL
CHLORIDE SERPL-SCNC: 103 MMOL/L
CHOLEST SERPL-MCNC: 395 MG/DL
CO2 SERPL-SCNC: 24 MMOL/L
CREAT SERPL-MCNC: 0.93 MG/DL
CREAT SPEC-SCNC: 44 MG/DL
EGFR: 93 ML/MIN/1.73M2
ESTIMATED AVERAGE GLUCOSE: 220 MG/DL
GLUCOSE SERPL-MCNC: 149 MG/DL
HBA1C MFR BLD HPLC: 9.3 %
HDLC SERPL-MCNC: 44 MG/DL
LDLC SERPL CALC-MCNC: NORMAL MG/DL
MICROALBUMIN 24H UR DL<=1MG/L-MCNC: 152.3 MG/DL
MICROALBUMIN/CREAT 24H UR-RTO: 3474 MG/G
NONHDLC SERPL-MCNC: 351 MG/DL
POTASSIUM SERPL-SCNC: 4.6 MMOL/L
SODIUM SERPL-SCNC: 138 MMOL/L
TRIGL SERPL-MCNC: 401 MG/DL

## 2022-04-20 ENCOUNTER — NON-APPOINTMENT (OUTPATIENT)
Age: 62
End: 2022-04-20

## 2022-04-20 ENCOUNTER — APPOINTMENT (OUTPATIENT)
Dept: CARDIOLOGY | Facility: CLINIC | Age: 62
End: 2022-04-20
Payer: MEDICAID

## 2022-04-20 VITALS
BODY MASS INDEX: 26.79 KG/M2 | WEIGHT: 166 LBS | DIASTOLIC BLOOD PRESSURE: 89 MMHG | SYSTOLIC BLOOD PRESSURE: 198 MMHG | HEART RATE: 73 BPM | OXYGEN SATURATION: 99 %

## 2022-04-20 PROCEDURE — 99215 OFFICE O/P EST HI 40 MIN: CPT

## 2022-04-20 PROCEDURE — 93000 ELECTROCARDIOGRAM COMPLETE: CPT

## 2022-04-20 NOTE — HISTORY OF PRESENT ILLNESS
[FreeTextEntry1] : 61-year-old male with history of hypertension, hyperlipidemia, coronary artery disease, coronary artery bypass graft surgery years ago, mild LV dysfunction.  He is here on a routine follow-up visit.  He admits that he had high salt load dinner last night and his blood pressure is significantly elevated because of that.  In addition I also reviewed his blood work and he has significant triglyceridemia.  It appears that his diet is high in processed foods sugars and simple carbohydrates.  Sinus rhythm right bundle branch block CAD CAD

## 2022-04-20 NOTE — REVIEW OF SYSTEMS
[Fever] : no fever [Blurry Vision] : no blurred vision [Earache] : no earache [SOB] : no shortness of breath [Leg Claudication] : no intermittent leg claudication [Cough] : no cough

## 2022-04-20 NOTE — DISCUSSION/SUMMARY
[FreeTextEntry1] : CAD CAD at this time he has no anginal symptoms.  I recommend that our focus should be on risk factor modification.  I had a very detailed discussion with the patient and I do not think there is any indication for coronary CTA or an angiogram considering that he has no symptoms.  Our focus should be on managing his weight blood pressure and metabolic derangements.  He has significant elevated triglycerides I had a detailed talk with him to cut out the simple sugars carbohydrates and focus on high-fiber foods.  In addition walking for 45 minutes daily.  His blood pressure is elevated which she claims is related to his high salt load diet that he had yesterday.  He seems to understand his underlying medical condition and I have reiterated to him the importance of diet lifestyle changes in addition to the medical therapy that he is on.

## 2022-04-20 NOTE — PHYSICAL EXAM
[Well Developed] : well developed [Normal Conjunctiva] : normal conjunctiva [Normal Venous Pressure] : normal venous pressure [Normal S1, S2] : normal S1, S2 [No Murmur] : no murmur [S3] : S3 [Clear Lung Fields] : clear lung fields

## 2022-06-09 ENCOUNTER — APPOINTMENT (OUTPATIENT)
Dept: INTERNAL MEDICINE | Facility: CLINIC | Age: 62
End: 2022-06-09
Payer: MEDICAID

## 2022-06-09 VITALS
TEMPERATURE: 97.9 F | DIASTOLIC BLOOD PRESSURE: 85 MMHG | HEIGHT: 66 IN | WEIGHT: 164 LBS | HEART RATE: 74 BPM | SYSTOLIC BLOOD PRESSURE: 156 MMHG | BODY MASS INDEX: 26.36 KG/M2

## 2022-06-09 PROCEDURE — 90715 TDAP VACCINE 7 YRS/> IM: CPT

## 2022-06-09 PROCEDURE — 90471 IMMUNIZATION ADMIN: CPT

## 2022-06-09 PROCEDURE — 99214 OFFICE O/P EST MOD 30 MIN: CPT | Mod: 25

## 2022-06-09 NOTE — HISTORY OF PRESENT ILLNESS
[de-identified] : 61 y.o. male, PMHx hypertension, diabetes, CAD (s/p quadruple CABG ~10 yr, ago), CHD, hyperlipidemia, proteinuria. Presents for follow up. \par Feeling well.  No dyspnea, no chest pain, no palpitations, no dizziness, no edema.  \par Has continued regular follow up with cardiology.   Recent office note reviewed which stressed the importance of dietary modification, weight loss and daily exercise for risk reduction in the setting of CAD, hyperlipidemia, hypertension, diabetes.  Which have not been well controlled due to medication and dietary nonadherence.  \par BP elevated, but has been out of his losartan.  \par

## 2022-06-09 NOTE — ASSESSMENT
[FreeTextEntry1] : Diabetes:\par Has been poorly controlled, A1C remains >9 \par Reports recent improvement with BG, has been checking regularly at home, .\par Taking medication as prescribed for about the past several months, including statin. \par Also reports dietary improvement. \par Declines CDE eval. \par Ophthalmology 2/2022, reports normal. \par Scheduled with podiatry - reports neuropathy now resolved with improved BG. \par Recheck A1C today, lipids, albuminuria, bmp.\par PPSV 2012\par \par Hypertension:\par Controlled when adherent with medication \par Rx refills sent\par \par CAD, CHF:\par s/p CABG in 2010\par Asymptomatic\par Cardiology f/u 2 months ago, no testing at this time.  Dietary and medication adherence stressed along with regular physical activity.  \par \par Hyperlipidemia:\par High dose satin, but nonadherent in the past, reports taking regularly now and reports some improvement in diet and physical activity. \par Check lipids with labs \par \par Proteinuria:\par SGLT advised by nephrology, now reports adherence with Jardiance without side effects. \par Importance of diabetes and BP control discussed\par Recheck albuminuria and bmp today\par \par HM\par UTD colo - 3/2020, repeat 10 years\par UTD dental\par Skin cancer screening advised, done 3/2020, will repeat next year \par UTD tdap - done today\par Shingrix completed \par Flu shot yearly\par COVID vaccine done with booster\par

## 2022-06-09 NOTE — PHYSICAL EXAM
[No Acute Distress] : no acute distress [Well-Appearing] : well-appearing [No Carotid Bruits] : no carotid bruits [Pedal Pulses Present] : the pedal pulses are present [No Edema] : there was no peripheral edema [Normal Supraclavicular Nodes] : no supraclavicular lymphadenopathy [Normal] : no joint swelling and grossly normal strength and tone [Coordination Grossly Intact] : coordination grossly intact [No Focal Deficits] : no focal deficits [Normal Gait] : normal gait [Comprehensive Foot Exam Normal] : Right and left foot were examined and both feet are normal. No ulcers in either foot. Toes are normal and with full ROM.  Normal tactile sensation with monofilament testing throughout both feet

## 2022-06-10 LAB
ANION GAP SERPL CALC-SCNC: 18 MMOL/L
BUN SERPL-MCNC: 24 MG/DL
CALCIUM SERPL-MCNC: 9.4 MG/DL
CHLORIDE SERPL-SCNC: 102 MMOL/L
CHOLEST SERPL-MCNC: 318 MG/DL
CO2 SERPL-SCNC: 20 MMOL/L
CREAT SERPL-MCNC: 1.11 MG/DL
CREAT SPEC-SCNC: 90 MG/DL
EGFR: 75 ML/MIN/1.73M2
ESTIMATED AVERAGE GLUCOSE: 169 MG/DL
FOLATE SERPL-MCNC: >20 NG/ML
GLUCOSE SERPL-MCNC: 143 MG/DL
HBA1C MFR BLD HPLC: 7.5 %
HDLC SERPL-MCNC: 41 MG/DL
LDLC SERPL CALC-MCNC: 206 MG/DL
MICROALBUMIN 24H UR DL<=1MG/L-MCNC: 106 MG/DL
MICROALBUMIN/CREAT 24H UR-RTO: 1179 MG/G
NONHDLC SERPL-MCNC: 276 MG/DL
POTASSIUM SERPL-SCNC: 4.4 MMOL/L
SODIUM SERPL-SCNC: 140 MMOL/L
TRIGL SERPL-MCNC: 351 MG/DL
VIT B12 SERPL-MCNC: 261 PG/ML

## 2022-07-20 ENCOUNTER — NON-APPOINTMENT (OUTPATIENT)
Age: 62
End: 2022-07-20

## 2022-07-20 ENCOUNTER — APPOINTMENT (OUTPATIENT)
Dept: CARDIOLOGY | Facility: CLINIC | Age: 62
End: 2022-07-20

## 2022-07-20 VITALS
SYSTOLIC BLOOD PRESSURE: 113 MMHG | OXYGEN SATURATION: 96 % | DIASTOLIC BLOOD PRESSURE: 70 MMHG | BODY MASS INDEX: 27.32 KG/M2 | WEIGHT: 170 LBS | HEART RATE: 59 BPM | HEIGHT: 66 IN

## 2022-07-20 PROCEDURE — 99215 OFFICE O/P EST HI 40 MIN: CPT | Mod: 25

## 2022-07-20 PROCEDURE — 93000 ELECTROCARDIOGRAM COMPLETE: CPT

## 2022-07-20 NOTE — DISCUSSION/SUMMARY
[FreeTextEntry1] : CAD : stable. no symptoms . goal is aggressive risk factor modification , he is fu PCP for lipid checks will request reusults\par \par MILD cARDIOMYOPAthy with no symptoms. no change in meds recommended

## 2022-07-20 NOTE — HISTORY OF PRESENT ILLNESS
[FreeTextEntry1] : 61-year-old male with history of hypertension, hyperlipidemia, coronary artery disease, coronary artery bypass graft surgery years ago, mild LV dysfunction. \par Patient denies chest pain, dyspnea, orthopnea, PND, edema, palpitations, syncope or presyncope. \par Patient is compliant with their medications.\par

## 2022-10-05 ENCOUNTER — APPOINTMENT (OUTPATIENT)
Dept: INTERNAL MEDICINE | Facility: CLINIC | Age: 62
End: 2022-10-05

## 2022-10-05 VITALS
TEMPERATURE: 97.7 F | HEIGHT: 66 IN | SYSTOLIC BLOOD PRESSURE: 134 MMHG | BODY MASS INDEX: 26.52 KG/M2 | WEIGHT: 165 LBS | HEART RATE: 64 BPM | DIASTOLIC BLOOD PRESSURE: 77 MMHG | OXYGEN SATURATION: 98 %

## 2022-10-05 DIAGNOSIS — K25.9 GASTRIC ULCER, UNSPECIFIED AS ACUTE OR CHRONIC, W/OUT HEMORRHAGE OR PERFORATION: ICD-10-CM

## 2022-10-05 DIAGNOSIS — D64.9 ANEMIA, UNSPECIFIED: ICD-10-CM

## 2022-10-05 PROCEDURE — 90686 IIV4 VACC NO PRSV 0.5 ML IM: CPT

## 2022-10-05 PROCEDURE — 99396 PREV VISIT EST AGE 40-64: CPT | Mod: 25

## 2022-10-05 PROCEDURE — G0008: CPT

## 2022-10-05 NOTE — ASSESSMENT
[FreeTextEntry1] : Diabetes:\par Greatly improved with medication adherence, however, reports nonadherence with diet when traveling. \par Declines CDE eval.  \par Due for ophthalmology f/u \par Recent podiatry f/u\par Reports adherence with statin\par Recheck A1C today\par PPSV 2012\par \par Hypertension:\par Controlled when adherent with medication \par \par CAD, CHF:\par Abnormal nuclear stress test\par Denies symptoms\par Continues regular f/u with cardiology, no further testing at this time\par \par Hyperlipidemia:\par High dose satin, reports adherent\par Check lipids with labs, fasting \par \par Proteinuria:\par Tolerating SGLT2, importance of diabetes control discussed \par Recheck with labs today \par \par Blurred vision:\par Resolved\par Recommend continued ophthalmology f/u \par \par HM\par UTD colo - 3/2020, repeat 10 years\par UTD dental\par Skin cancer screening advised, done 3/2020\par tdap UTD - 2022\par Shingrix completed\par Flu shot today\par COVID vaccine completed, booster done about 9 months ago recommend new bivalent booster at this time\par check labs \par \par

## 2022-10-05 NOTE — HEALTH RISK ASSESSMENT
[Good] : ~his/her~  mood as  good [No] : In the past 12 months have you used drugs other than those required for medical reasons? No [No falls in past year] : Patient reported no falls in the past year [0] : 1) Little interest or pleasure doing things: Not at all (0) [PHQ-2 Negative - No further assessment needed] : PHQ-2 Negative - No further assessment needed [No Retinopathy] : No retinopathy [Patient reported colonoscopy was normal] : Patient reported colonoscopy was normal [With Family] : lives with family [Fully functional (bathing, dressing, toileting, transferring, walking, feeding)] : Fully functional (bathing, dressing, toileting, transferring, walking, feeding) [Fully functional (using the telephone, shopping, preparing meals, housekeeping, doing laundry, using] : Fully functional and needs no help or supervision to perform IADLs (using the telephone, shopping, preparing meals, housekeeping, doing laundry, using transportation, managing medications and managing finances) [Smoke Detector] : smoke detector [Carbon Monoxide Detector] : carbon monoxide detector [With Patient/Caregiver] : , with patient/caregiver [Reviewed no changes] : Reviewed, no changes [Designated Healthcare Proxy] : Designated healthcare proxy [Name: ___] : Health Care Proxy's Name: [unfilled]  [Relationship: ___] : Relationship: [unfilled] [I will adhere to the patient's wishes.] : I will adhere to the patient's wishes. [de-identified] : walking  [de-identified] : diabetic, low salt, low fat  [[id number]] : 0 [EyeExamDate] : 04/21 [Change in mental status noted] : No change in mental status noted [Reports changes in hearing] : Reports no changes in hearing [Reports changes in vision] : Reports no changes in vision [Reports changes in dental health] : Reports no changes in dental health [ColonoscopyDate] : 3/2020 [AdvancecareDate] : 09/21

## 2022-10-05 NOTE — HISTORY OF PRESENT ILLNESS
[FreeTextEntry1] : CPE/wellness visit  [de-identified] : 61 y.o. male, PMHx hypertension, diabetes, CAD, CHD, hyperlipidemia, proteinuria.\par \par Feeling overall well. \par No chest pain, no dyspnea, no dizziness, no edema.  \par -170 (highs after sweets).  \par Reports taking all meds as prescribed.  \par Active throughout the day - walking, steps.  \par Recent cardiology follow up, note reviewed.   No med changes, no further testing at this time.  \par Meeds to schedule follow up with ophthalmology.  \par Feet feel well, provided with diabetic shoes.  Recent follow up with podiatry. \par

## 2022-10-06 ENCOUNTER — NON-APPOINTMENT (OUTPATIENT)
Age: 62
End: 2022-10-06

## 2022-10-06 LAB
ALBUMIN SERPL ELPH-MCNC: 4.2 G/DL
ALP BLD-CCNC: 108 U/L
ALT SERPL-CCNC: 12 U/L
ANION GAP SERPL CALC-SCNC: 14 MMOL/L
AST SERPL-CCNC: 12 U/L
BASOPHILS # BLD AUTO: 0.03 K/UL
BASOPHILS NFR BLD AUTO: 0.5 %
BILIRUB SERPL-MCNC: 0.2 MG/DL
BUN SERPL-MCNC: 18 MG/DL
CALCIUM SERPL-MCNC: 9.4 MG/DL
CHLORIDE SERPL-SCNC: 104 MMOL/L
CHOLEST SERPL-MCNC: 288 MG/DL
CO2 SERPL-SCNC: 23 MMOL/L
CREAT SERPL-MCNC: 1.15 MG/DL
CREAT SPEC-SCNC: 25 MG/DL
EGFR: 72 ML/MIN/1.73M2
EOSINOPHIL # BLD AUTO: 0.14 K/UL
EOSINOPHIL NFR BLD AUTO: 2.2 %
ESTIMATED AVERAGE GLUCOSE: 166 MG/DL
GLUCOSE SERPL-MCNC: 111 MG/DL
HBA1C MFR BLD HPLC: 7.4 %
HCT VFR BLD CALC: 43 %
HDLC SERPL-MCNC: 40 MG/DL
HGB BLD-MCNC: 13.8 G/DL
IMM GRANULOCYTES NFR BLD AUTO: 0.3 %
LDLC SERPL CALC-MCNC: 169 MG/DL
LYMPHOCYTES # BLD AUTO: 2.39 K/UL
LYMPHOCYTES NFR BLD AUTO: 37.6 %
MAN DIFF?: NORMAL
MCHC RBC-ENTMCNC: 28.4 PG
MCHC RBC-ENTMCNC: 32.1 GM/DL
MCV RBC AUTO: 88.5 FL
MICROALBUMIN 24H UR DL<=1MG/L-MCNC: 21.4 MG/DL
MICROALBUMIN/CREAT 24H UR-RTO: 862 MG/G
MONOCYTES # BLD AUTO: 0.41 K/UL
MONOCYTES NFR BLD AUTO: 6.4 %
NEUTROPHILS # BLD AUTO: 3.37 K/UL
NEUTROPHILS NFR BLD AUTO: 53 %
NONHDLC SERPL-MCNC: 248 MG/DL
PLATELET # BLD AUTO: 230 K/UL
POTASSIUM SERPL-SCNC: 4.4 MMOL/L
PROT SERPL-MCNC: 7.8 G/DL
PSA SERPL-MCNC: 1.25 NG/ML
RBC # BLD: 4.86 M/UL
RBC # FLD: 14.2 %
SODIUM SERPL-SCNC: 140 MMOL/L
TRIGL SERPL-MCNC: 396 MG/DL
VIT B12 SERPL-MCNC: 290 PG/ML
WBC # FLD AUTO: 6.36 K/UL

## 2022-12-06 ENCOUNTER — RX RENEWAL (OUTPATIENT)
Age: 62
End: 2022-12-06

## 2022-12-07 ENCOUNTER — RX RENEWAL (OUTPATIENT)
Age: 62
End: 2022-12-07

## 2023-01-11 ENCOUNTER — NON-APPOINTMENT (OUTPATIENT)
Age: 63
End: 2023-01-11

## 2023-01-11 ENCOUNTER — APPOINTMENT (OUTPATIENT)
Dept: OPHTHALMOLOGY | Facility: CLINIC | Age: 63
End: 2023-01-11
Payer: MEDICAID

## 2023-01-11 PROCEDURE — 92014 COMPRE OPH EXAM EST PT 1/>: CPT

## 2023-01-18 ENCOUNTER — NON-APPOINTMENT (OUTPATIENT)
Age: 63
End: 2023-01-18

## 2023-01-18 ENCOUNTER — APPOINTMENT (OUTPATIENT)
Dept: CARDIOLOGY | Facility: CLINIC | Age: 63
End: 2023-01-18
Payer: MEDICAID

## 2023-01-18 VITALS
HEART RATE: 58 BPM | OXYGEN SATURATION: 98 % | SYSTOLIC BLOOD PRESSURE: 162 MMHG | BODY MASS INDEX: 27.33 KG/M2 | DIASTOLIC BLOOD PRESSURE: 81 MMHG | HEIGHT: 66 IN | TEMPERATURE: 97.5 F

## 2023-01-18 VITALS — WEIGHT: 169.31 LBS | BODY MASS INDEX: 27.33 KG/M2

## 2023-01-18 PROCEDURE — 93000 ELECTROCARDIOGRAM COMPLETE: CPT

## 2023-01-18 PROCEDURE — 99214 OFFICE O/P EST MOD 30 MIN: CPT | Mod: 25

## 2023-01-18 NOTE — DISCUSSION/SUMMARY
[FreeTextEntry1] : CAD : stable. no symptoms . goal is aggressive risk factor modification , he is fu PCP for lipid checks will request reusults\par \par MILD cARDIOMYOPAthy with no symptoms. no change in meds recommended  [EKG obtained to assist in diagnosis and management of assessed problem(s)] : EKG obtained to assist in diagnosis and management of assessed problem(s)

## 2023-01-25 ENCOUNTER — APPOINTMENT (OUTPATIENT)
Dept: INTERNAL MEDICINE | Facility: CLINIC | Age: 63
End: 2023-01-25
Payer: MEDICAID

## 2023-01-25 VITALS
OXYGEN SATURATION: 97 % | BODY MASS INDEX: 26.36 KG/M2 | WEIGHT: 164 LBS | TEMPERATURE: 97.9 F | HEART RATE: 50 BPM | DIASTOLIC BLOOD PRESSURE: 64 MMHG | HEIGHT: 66 IN | SYSTOLIC BLOOD PRESSURE: 103 MMHG

## 2023-01-25 DIAGNOSIS — I25.10 ATHEROSCLEROTIC HEART DISEASE OF NATIVE CORONARY ARTERY W/OUT ANGINA PECTORIS: ICD-10-CM

## 2023-01-25 DIAGNOSIS — E78.5 HYPERLIPIDEMIA, UNSPECIFIED: ICD-10-CM

## 2023-01-25 DIAGNOSIS — R94.31 ABNORMAL ELECTROCARDIOGRAM [ECG] [EKG]: ICD-10-CM

## 2023-01-25 DIAGNOSIS — K21.00 GASTRO-ESOPHAGEAL REFLUX DISEASE WITH ESOPHAGITIS, WITHOUT BLEEDING: ICD-10-CM

## 2023-01-25 DIAGNOSIS — I50.9 HEART FAILURE, UNSPECIFIED: ICD-10-CM

## 2023-01-25 DIAGNOSIS — I25.9 CHRONIC ISCHEMIC HEART DISEASE, UNSPECIFIED: ICD-10-CM

## 2023-01-25 PROCEDURE — 99214 OFFICE O/P EST MOD 30 MIN: CPT

## 2023-01-25 RX ORDER — BLOOD-GLUCOSE METER
W/DEVICE KIT MISCELLANEOUS
Qty: 1 | Refills: 1 | Status: ACTIVE | COMMUNITY
Start: 2018-08-28 | End: 1900-01-01

## 2023-01-25 RX ORDER — LANCETS 28 GAUGE
EACH MISCELLANEOUS
Qty: 100 | Refills: 5 | Status: ACTIVE | COMMUNITY
Start: 2018-08-28 | End: 1900-01-01

## 2023-01-25 RX ORDER — OMEPRAZOLE 40 MG/1
40 CAPSULE, DELAYED RELEASE ORAL
Qty: 90 | Refills: 1 | Status: DISCONTINUED | COMMUNITY
Start: 2020-03-16 | End: 2023-01-25

## 2023-01-25 RX ORDER — BLOOD-GLUCOSE METER
70 EACH MISCELLANEOUS
Qty: 1 | Refills: 5 | Status: ACTIVE | COMMUNITY
Start: 2018-02-05 | End: 1900-01-01

## 2023-01-25 NOTE — PHYSICAL EXAM
[No Acute Distress] : no acute distress [Well-Appearing] : well-appearing [No Carotid Bruits] : no carotid bruits [Pedal Pulses Present] : the pedal pulses are present [No Edema] : there was no peripheral edema [Coordination Grossly Intact] : coordination grossly intact [No Focal Deficits] : no focal deficits [Normal Gait] : normal gait [Normal] : affect was normal and insight and judgment were intact [Comprehensive Foot Exam Normal] : Right and left foot were examined and both feet are normal. No ulcers in either foot. Toes are normal and with full ROM.  Normal tactile sensation with monofilament testing throughout both feet

## 2023-01-25 NOTE — ASSESSMENT
[FreeTextEntry1] : Diabetes:\par Greatly improved with medication adherence, however, reports nonadherence with diet when traveling and holidays.   \par Would like to stop metformin d/t nausea.  Will increase glimepiride to 4mg.  \par Renewal provided for glucometer, strips, lances.  \par Declines CDE eval.  \par UTD opthalmology.\par Podiatry f/u about 6 months ago.  \par On statin not taking regularly.  Recheck lipids today, fasting \par Recheck A1C today.\par PPSV 2012\par \par Peripheral neuropathy:\par Discussed importance of BG control.  \par Declines medication for neuropathy at this time\par \par Hypertension:\par Controlled when adherent with medication \par \par CAD, CHF:\par Abnormal nuclear stress test\par Denies symptoms\par Continues regular f/u with cardiology, no further testing at this time\par \par Hyperlipidemia:\par High dose satin, reports not adherent for a few weeks \par Check lipids with labs, fasting \par \par Proteinuria:\par Tolerating SGLT2, importance of diabetes control discussed \par Improved, recheck next visit \par \par Blurred vision:\par Resolved\par Continued ophthalmology f/u, reports recent f/u with no issue and to return in 1 year \par \par HM\par UTD colo - 3/2020, repeat 10 years\par UTD dental\par Skin cancer screening advised, done 3/2020\par tdap UTD - 2022\par Shingrix completed\par Flu shot earlier in the season\par COVID vaccine completed, booster done about 9 months ago recommend new bivalent booster at this time\par

## 2023-01-25 NOTE — HISTORY OF PRESENT ILLNESS
[de-identified] : 61 y.o. male, PMHx hypertension, diabetes, CAD, CHD, hyperlipidemia, proteinuria.\par Feeling overall well. \par Reports recent opthalmology follow up, reports normal.  To return in 1 year.\par Recent cardiology f/u - BP elevated at the visit as thought he shouldn't take his meds for visit.  Lab work requested.  \par Sugar high recently, had some soda also with increased sweets over the holidays.    \par Has been having some hip pain recently, bilateral.  Also some burning in the feet.  Went for a long walk recently and couldn't do it all because of the discomfort.  Active at home, up and down the stairs with no difficulty.  \par Feet burning sometimes bothers him at night.  \par No chest pain, no dyspnea, no dizziness, no edema.\par

## 2023-01-26 ENCOUNTER — NON-APPOINTMENT (OUTPATIENT)
Age: 63
End: 2023-01-26

## 2023-01-26 LAB
ANION GAP SERPL CALC-SCNC: 16 MMOL/L
BUN SERPL-MCNC: 31 MG/DL
CALCIUM SERPL-MCNC: 10 MG/DL
CHLORIDE SERPL-SCNC: 102 MMOL/L
CHOLEST SERPL-MCNC: 344 MG/DL
CO2 SERPL-SCNC: 22 MMOL/L
CREAT SERPL-MCNC: 1.38 MG/DL
EGFR: 57 ML/MIN/1.73M2
ESTIMATED AVERAGE GLUCOSE: 180 MG/DL
GLUCOSE SERPL-MCNC: 197 MG/DL
HBA1C MFR BLD HPLC: 7.9 %
HDLC SERPL-MCNC: 39 MG/DL
LDLC SERPL CALC-MCNC: NORMAL MG/DL
NONHDLC SERPL-MCNC: 305 MG/DL
POTASSIUM SERPL-SCNC: 4.8 MMOL/L
SODIUM SERPL-SCNC: 140 MMOL/L
TRIGL SERPL-MCNC: 485 MG/DL
VIT B12 SERPL-MCNC: 218 PG/ML

## 2023-04-25 ENCOUNTER — APPOINTMENT (OUTPATIENT)
Dept: INTERNAL MEDICINE | Facility: CLINIC | Age: 63
End: 2023-04-25

## 2023-06-05 ENCOUNTER — APPOINTMENT (OUTPATIENT)
Dept: INTERNAL MEDICINE | Facility: CLINIC | Age: 63
End: 2023-06-05
Payer: MEDICAID

## 2023-06-05 VITALS
HEART RATE: 78 BPM | HEIGHT: 66 IN | SYSTOLIC BLOOD PRESSURE: 158 MMHG | DIASTOLIC BLOOD PRESSURE: 91 MMHG | BODY MASS INDEX: 24.59 KG/M2 | TEMPERATURE: 98.2 F | WEIGHT: 153 LBS | OXYGEN SATURATION: 98 %

## 2023-06-05 DIAGNOSIS — M79.606 PAIN IN LEG, UNSPECIFIED: ICD-10-CM

## 2023-06-05 DIAGNOSIS — M25.559 PAIN IN UNSPECIFIED HIP: ICD-10-CM

## 2023-06-05 PROCEDURE — 99214 OFFICE O/P EST MOD 30 MIN: CPT

## 2023-06-05 NOTE — ASSESSMENT
[FreeTextEntry1] : Diabetes:\par Reports some indiscretion with diet when travelling, but has been adhearant with medication.  \par Declines CDE eval.  \par UTD opthalmology.\par Podiatry yearly \par On statin not taking regularly.  Recheck lipids today.\par Recheck A1C today.\par PPSV 2012\par \par Peripheral neuropathy:\par Discussed importance of BG control.  \par Declines medication for neuropathy at this time\par \par Hypertension:\par Controlled when adherent with medication \par \par CAD, CHF:\par Abnormal nuclear stress test\par Denies symptoms\par Continues regular f/u with cardiology, no further testing at this time\par \par Hyperlipidemia:\par High dose satin\par Check lipids with labs, fasting \par \par Proteinuria, elevated creatinine:\par Did not do repeat blood work, repeat labs today\par Tolerating SGLT2, importance of diabetes control discussed \par  \par Blurred vision:\par Resolved\par Continued ophthalmology f/u, reports recent f/u with no issue and to return in 1 year \par \par Bilateral hip and lower leg pain with prolonged walking:\par ?claudication, however no pain with activities around the house including up and and down the stairs\par ?Radiculopathy\par Unremarkable exam, no back pain\par Will start PT, stretching\par F/u if not improving, will send for vascular eval and ortho\par \par HM\par UTD colo - 3/2020, repeat 10 years\par UTD dental\par Skin cancer screening advised, done 3/2020\par tdap UTD - 2022\par Shingrix completed\par Flu shot yearly\par COVID vaccine completed, booster done about 9 months ago recommend new bivalent booster at this time\par \par F/u 3 months\par Form completed pending TB result, will fax when completed.

## 2023-06-05 NOTE — PHYSICAL EXAM
[No Acute Distress] : no acute distress [Well-Appearing] : well-appearing [No Carotid Bruits] : no carotid bruits [Pedal Pulses Present] : the pedal pulses are present [No Edema] : there was no peripheral edema [Coordination Grossly Intact] : coordination grossly intact [No Focal Deficits] : no focal deficits [Normal Gait] : normal gait [Comprehensive Foot Exam Normal] : Right and left foot were examined and both feet are normal. No ulcers in either foot. Toes are normal and with full ROM.  Normal tactile sensation with monofilament testing throughout both feet [Normal] : no CVA or spinal tenderness

## 2023-06-05 NOTE — HISTORY OF PRESENT ILLNESS
[de-identified] : 61 y.o. male, PMHx hypertension, diabetes, CAD, CHD, hyperlipidemia, proteinuria.\par Feeling overall well. \par Has been getting some dental work, stopped ASA for that.  \par Continues all meds except for amlodipine (not sure why he stopped it).  \par Has been taking bilateral calf and hip pain with walking the past 2-3 weeks.  No swelling.  Pain start after walking about 1/2 block pain occurs, resolved after 1-2 min of rest, but continue to return as he walks.  \par Neuropathy in the feet on and off, not usually bothersome.  \par No edema, no chest pain, no dyspnea, no cough, no fever/chills/night sweats, no fatigue, no dizziness, no GI/ symptoms. \par

## 2023-06-06 ENCOUNTER — RX RENEWAL (OUTPATIENT)
Age: 63
End: 2023-06-06

## 2023-06-07 LAB
ALBUMIN SERPL ELPH-MCNC: 3.9 G/DL
ALP BLD-CCNC: 105 U/L
ALT SERPL-CCNC: 14 U/L
ANION GAP SERPL CALC-SCNC: 12 MMOL/L
AST SERPL-CCNC: 15 U/L
BILIRUB SERPL-MCNC: 0.3 MG/DL
BUN SERPL-MCNC: 27 MG/DL
CALCIUM SERPL-MCNC: 9.5 MG/DL
CHLORIDE SERPL-SCNC: 107 MMOL/L
CHOLEST SERPL-MCNC: 358 MG/DL
CK SERPL-CCNC: 65 U/L
CO2 SERPL-SCNC: 22 MMOL/L
CREAT SERPL-MCNC: 1.31 MG/DL
CREAT SPEC-SCNC: 160 MG/DL
EGFR: 61 ML/MIN/1.73M2
ESTIMATED AVERAGE GLUCOSE: 229 MG/DL
GLUCOSE SERPL-MCNC: 100 MG/DL
HBA1C MFR BLD HPLC: 9.6 %
HCT VFR BLD CALC: 41.3 %
HDLC SERPL-MCNC: 37 MG/DL
HGB BLD-MCNC: 13.6 G/DL
LDLC SERPL CALC-MCNC: NORMAL MG/DL
M TB IFN-G BLD-IMP: NEGATIVE
MCHC RBC-ENTMCNC: 29.1 PG
MCHC RBC-ENTMCNC: 32.9 GM/DL
MCV RBC AUTO: 88.2 FL
MICROALBUMIN 24H UR DL<=1MG/L-MCNC: 194 MG/DL
MICROALBUMIN/CREAT 24H UR-RTO: 1216 MG/G
NONHDLC SERPL-MCNC: 321 MG/DL
PLATELET # BLD AUTO: 200 K/UL
POTASSIUM SERPL-SCNC: 4.6 MMOL/L
PROT SERPL-MCNC: 7.7 G/DL
QUANTIFERON TB PLUS MITOGEN MINUS NIL: 5.65 IU/ML
QUANTIFERON TB PLUS NIL: 0.06 IU/ML
QUANTIFERON TB PLUS TB1 MINUS NIL: 0.33 IU/ML
QUANTIFERON TB PLUS TB2 MINUS NIL: 0.19 IU/ML
RBC # BLD: 4.68 M/UL
RBC # FLD: 14.3 %
SODIUM SERPL-SCNC: 141 MMOL/L
TRIGL SERPL-MCNC: 400 MG/DL
VIT B12 SERPL-MCNC: 284 PG/ML
WBC # FLD AUTO: 7.03 K/UL

## 2023-06-08 LAB — METHYLMALONATE SERPL-SCNC: 286 NMOL/L

## 2023-07-16 NOTE — STROKE CODE NOTE - CT PERFORMED
04-Dec-2019 10:15
Patient requests all Lab, Cardiology, and Radiology Results on their Discharge Instructions

## 2023-07-19 ENCOUNTER — NON-APPOINTMENT (OUTPATIENT)
Age: 63
End: 2023-07-19

## 2023-07-19 ENCOUNTER — APPOINTMENT (OUTPATIENT)
Dept: CARDIOLOGY | Facility: CLINIC | Age: 63
End: 2023-07-19
Payer: MEDICAID

## 2023-07-19 VITALS
OXYGEN SATURATION: 97 % | DIASTOLIC BLOOD PRESSURE: 72 MMHG | RESPIRATION RATE: 16 BRPM | TEMPERATURE: 97.4 F | HEART RATE: 66 BPM | SYSTOLIC BLOOD PRESSURE: 136 MMHG | WEIGHT: 166 LBS | BODY MASS INDEX: 26.79 KG/M2

## 2023-07-19 DIAGNOSIS — R94.31 ABNORMAL ELECTROCARDIOGRAM [ECG] [EKG]: ICD-10-CM

## 2023-07-19 PROCEDURE — 99214 OFFICE O/P EST MOD 30 MIN: CPT | Mod: 25

## 2023-07-19 PROCEDURE — 93000 ELECTROCARDIOGRAM COMPLETE: CPT

## 2023-07-19 NOTE — HISTORY OF PRESENT ILLNESS
[FreeTextEntry1] : 62-year-old male with history of hypertension, hyperlipidemia, coronary artery disease, coronary artery bypass graft surgery years ago, mild LV dysfunction. \par PATIENT ADMITS THAT HE WAS NONCOMPLIANT WITH MEDS AND HAS NOT BEEN FOLLOWING A GOOD DIET. \par SINCE LAST 1-2 MONTHS HE IS BACK TO TAKING ALL HIS MEDS. \par Patient denies chest pain, dyspnea, orthopnea, PND, edema, palpitations, syncope or presyncope. \par

## 2023-07-19 NOTE — DISCUSSION/SUMMARY
[FreeTextEntry1] : CAD : poorly controlled risk factors\par PATIENT ADMITS THAT HE WAS NONCOMPLIANT WITH MEDS AND HAS NOT BEEN FOLLOWING A GOOD DIET. SINCE LAST 1-2 MONTHS HE IS BACK TO TAKING ALL HIS MEDS. \par He will continue with ASA, Atorvastatin 40mg po daily\par \par MILD cARDIOMYOPAthy with no symptoms. no change in meds recommended Losartan and Coreg 25bid\par \par HTN: BP is borderline, recommend medications compliance as above and follow low salt diet/exercise\par \par Type 2DM: A1c was 9.2 Patient is back on his OHA and is following up with PCP [EKG obtained to assist in diagnosis and management of assessed problem(s)] : EKG obtained to assist in diagnosis and management of assessed problem(s)

## 2023-07-19 NOTE — PHYSICAL EXAM
[Normal Conjunctiva] : normal conjunctiva [Well Developed] : well developed [Normal Venous Pressure] : normal venous pressure [Normal S1, S2] : normal S1, S2 [No Murmur] : no murmur [S3] : S3 [Clear Lung Fields] : clear lung fields

## 2023-07-25 ENCOUNTER — APPOINTMENT (OUTPATIENT)
Dept: NEPHROLOGY | Facility: CLINIC | Age: 63
End: 2023-07-25

## 2023-08-11 ENCOUNTER — APPOINTMENT (OUTPATIENT)
Dept: INTERNAL MEDICINE | Facility: CLINIC | Age: 63
End: 2023-08-11

## 2023-09-12 ENCOUNTER — MED ADMIN CHARGE (OUTPATIENT)
Age: 63
End: 2023-09-12

## 2023-09-12 ENCOUNTER — APPOINTMENT (OUTPATIENT)
Dept: INTERNAL MEDICINE | Facility: CLINIC | Age: 63
End: 2023-09-12
Payer: MEDICAID

## 2023-09-12 VITALS
BODY MASS INDEX: 26.84 KG/M2 | WEIGHT: 167 LBS | HEART RATE: 79 BPM | TEMPERATURE: 97.5 F | SYSTOLIC BLOOD PRESSURE: 150 MMHG | HEIGHT: 66 IN | DIASTOLIC BLOOD PRESSURE: 82 MMHG | OXYGEN SATURATION: 98 %

## 2023-09-12 DIAGNOSIS — I50.42 CHRONIC COMBINED SYSTOLIC (CONGESTIVE) AND DIASTOLIC (CONGESTIVE) HEART FAILURE: ICD-10-CM

## 2023-09-12 DIAGNOSIS — R09.89 OTHER SPECIFIED SYMPTOMS AND SIGNS INVOLVING THE CIRCULATORY AND RESPIRATORY SYSTEMS: ICD-10-CM

## 2023-09-12 DIAGNOSIS — Z23 ENCOUNTER FOR IMMUNIZATION: ICD-10-CM

## 2023-09-12 PROCEDURE — 99214 OFFICE O/P EST MOD 30 MIN: CPT

## 2023-09-12 PROCEDURE — 90686 IIV4 VACC NO PRSV 0.5 ML IM: CPT

## 2023-09-12 PROCEDURE — G0008: CPT

## 2023-09-12 RX ORDER — EMPAGLIFLOZIN 10 MG/1
10 TABLET, FILM COATED ORAL
Qty: 90 | Refills: 3 | Status: DISCONTINUED | COMMUNITY
Start: 2020-10-20 | End: 2023-09-12

## 2023-09-14 ENCOUNTER — NON-APPOINTMENT (OUTPATIENT)
Age: 63
End: 2023-09-14

## 2023-09-14 LAB
ALBUMIN SERPL ELPH-MCNC: 3.8 G/DL
ALP BLD-CCNC: 107 U/L
ALT SERPL-CCNC: 13 U/L
ANION GAP SERPL CALC-SCNC: 14 MMOL/L
AST SERPL-CCNC: 14 U/L
BILIRUB SERPL-MCNC: 0.2 MG/DL
BUN SERPL-MCNC: 20 MG/DL
CALCIUM SERPL-MCNC: 9.2 MG/DL
CHLORIDE SERPL-SCNC: 101 MMOL/L
CHOLEST SERPL-MCNC: 353 MG/DL
CO2 SERPL-SCNC: 21 MMOL/L
CREAT SERPL-MCNC: 1.27 MG/DL
CREAT SPEC-SCNC: 205 MG/DL
EGFR: 63 ML/MIN/1.73M2
GLUCOSE SERPL-MCNC: 84 MG/DL
HDLC SERPL-MCNC: 33 MG/DL
LDLC SERPL CALC-MCNC: 183 MG/DL
MICROALBUMIN 24H UR DL<=1MG/L-MCNC: 305.4 MG/DL
MICROALBUMIN/CREAT 24H UR-RTO: 1487 MG/G
NONHDLC SERPL-MCNC: 319 MG/DL
POTASSIUM SERPL-SCNC: 4 MMOL/L
PROT SERPL-MCNC: 7.4 G/DL
SODIUM SERPL-SCNC: 136 MMOL/L
TRIGL SERPL-MCNC: 645 MG/DL
VIT B12 SERPL-MCNC: 796 PG/ML

## 2023-10-18 ENCOUNTER — APPOINTMENT (OUTPATIENT)
Dept: CARDIOLOGY | Facility: CLINIC | Age: 63
End: 2023-10-18

## 2023-12-12 ENCOUNTER — APPOINTMENT (OUTPATIENT)
Dept: INTERNAL MEDICINE | Facility: CLINIC | Age: 63
End: 2023-12-12
Payer: MEDICAID

## 2023-12-12 VITALS
BODY MASS INDEX: 25.82 KG/M2 | DIASTOLIC BLOOD PRESSURE: 77 MMHG | TEMPERATURE: 97.6 F | SYSTOLIC BLOOD PRESSURE: 143 MMHG | HEART RATE: 77 BPM | OXYGEN SATURATION: 98 % | WEIGHT: 160 LBS

## 2023-12-12 DIAGNOSIS — E53.8 DEFICIENCY OF OTHER SPECIFIED B GROUP VITAMINS: ICD-10-CM

## 2023-12-12 DIAGNOSIS — E11.42 TYPE 2 DIABETES MELLITUS WITH DIABETIC POLYNEUROPATHY: ICD-10-CM

## 2023-12-12 DIAGNOSIS — Z00.00 ENCOUNTER FOR GENERAL ADULT MEDICAL EXAMINATION W/OUT ABNORMAL FINDINGS: ICD-10-CM

## 2023-12-12 DIAGNOSIS — Z13.5 ENCOUNTER FOR SCREENING FOR EYE AND EAR DISORDERS: ICD-10-CM

## 2023-12-12 LAB — HBA1C MFR BLD HPLC: 6.8

## 2023-12-12 PROCEDURE — 83036 HEMOGLOBIN GLYCOSYLATED A1C: CPT | Mod: QW

## 2023-12-12 PROCEDURE — 99396 PREV VISIT EST AGE 40-64: CPT | Mod: 25

## 2023-12-12 RX ORDER — FENOFIBRATE 145 MG/1
145 TABLET, COATED ORAL
Qty: 90 | Refills: 3 | Status: ACTIVE | COMMUNITY
Start: 2023-12-12 | End: 1900-01-01

## 2023-12-12 RX ORDER — CHLORHEXIDINE GLUCONATE 4 %
1000 LIQUID (ML) TOPICAL
Qty: 90 | Refills: 3 | Status: ACTIVE | COMMUNITY
Start: 2022-06-10 | End: 1900-01-01

## 2023-12-18 ENCOUNTER — NON-APPOINTMENT (OUTPATIENT)
Age: 63
End: 2023-12-18

## 2023-12-18 DIAGNOSIS — E56.9 VITAMIN DEFICIENCY, UNSPECIFIED: ICD-10-CM

## 2023-12-18 LAB
25(OH)D3 SERPL-MCNC: 17.4 NG/ML
ALBUMIN SERPL ELPH-MCNC: 4.2 G/DL
ALP BLD-CCNC: 95 U/L
ALT SERPL-CCNC: 13 U/L
ANION GAP SERPL CALC-SCNC: 13 MMOL/L
AST SERPL-CCNC: 13 U/L
BILIRUB SERPL-MCNC: 0.4 MG/DL
BUN SERPL-MCNC: 24 MG/DL
CALCIUM SERPL-MCNC: 9.2 MG/DL
CHLORIDE SERPL-SCNC: 105 MMOL/L
CHOLEST SERPL-MCNC: 278 MG/DL
CO2 SERPL-SCNC: 22 MMOL/L
CREAT SERPL-MCNC: 1.26 MG/DL
CREAT SPEC-SCNC: 86 MG/DL
EGFR: 64 ML/MIN/1.73M2
ESTIMATED AVERAGE GLUCOSE: 137 MG/DL
FERRITIN SERPL-MCNC: 110 NG/ML
FOLATE SERPL-MCNC: 17.4 NG/ML
GLUCOSE SERPL-MCNC: 119 MG/DL
HBA1C MFR BLD HPLC: 6.4 %
HCT VFR BLD CALC: 37.5 %
HDLC SERPL-MCNC: 37 MG/DL
HGB BLD-MCNC: 11.6 G/DL
IRON SATN MFR SERPL: 18 %
IRON SERPL-MCNC: 59 UG/DL
LDLC SERPL CALC-MCNC: 194 MG/DL
MCHC RBC-ENTMCNC: 28.2 PG
MCHC RBC-ENTMCNC: 30.9 GM/DL
MCV RBC AUTO: 91 FL
MICROALBUMIN 24H UR DL<=1MG/L-MCNC: 122.6 MG/DL
MICROALBUMIN/CREAT 24H UR-RTO: 1432 MG/G
NONHDLC SERPL-MCNC: 241 MG/DL
PLATELET # BLD AUTO: 259 K/UL
POTASSIUM SERPL-SCNC: 4.7 MMOL/L
PROT SERPL-MCNC: 7.7 G/DL
PSA SERPL-MCNC: 1.12 NG/ML
RBC # BLD: 4.12 M/UL
RBC # FLD: 15.9 %
SODIUM SERPL-SCNC: 141 MMOL/L
TIBC SERPL-MCNC: 338 UG/DL
TRIGL SERPL-MCNC: 241 MG/DL
TSH SERPL-ACNC: 1.86 UIU/ML
UIBC SERPL-MCNC: 278 UG/DL
VIT B12 SERPL-MCNC: 722 PG/ML
WBC # FLD AUTO: 6.78 K/UL

## 2023-12-26 ENCOUNTER — APPOINTMENT (OUTPATIENT)
Dept: CT IMAGING | Facility: IMAGING CENTER | Age: 63
End: 2023-12-26

## 2024-02-01 ENCOUNTER — APPOINTMENT (OUTPATIENT)
Dept: VASCULAR SURGERY | Facility: CLINIC | Age: 64
End: 2024-02-01
Payer: MEDICAID

## 2024-02-01 DIAGNOSIS — I65.23 OCCLUSION AND STENOSIS OF BILATERAL CAROTID ARTERIES: ICD-10-CM

## 2024-02-01 DIAGNOSIS — I73.9 PERIPHERAL VASCULAR DISEASE, UNSPECIFIED: ICD-10-CM

## 2024-02-01 PROCEDURE — 93880 EXTRACRANIAL BILAT STUDY: CPT

## 2024-02-01 PROCEDURE — 99203 OFFICE O/P NEW LOW 30 MIN: CPT

## 2024-02-01 PROCEDURE — 93923 UPR/LXTR ART STDY 3+ LVLS: CPT

## 2024-02-02 PROBLEM — I73.9 PAD (PERIPHERAL ARTERY DISEASE): Status: ACTIVE | Noted: 2024-02-02

## 2024-02-04 PROBLEM — I65.23 ASYMPTOMATIC BILATERAL CAROTID ARTERY STENOSIS: Status: ACTIVE | Noted: 2024-02-04

## 2024-02-04 NOTE — PHYSICAL EXAM
[2+] : left 2+ [0] : right 0 [1+] : left 1+ [Skin Ulcer] : no ulcer [Calm] : calm [de-identified] : well appearing male, NAD [FreeTextEntry1] : feet warm and well perfused [de-identified] : unlabored breathing [de-identified] : soft, NT

## 2024-02-04 NOTE — HISTORY OF PRESENT ILLNESS
[FreeTextEntry1] : EVERTON MOSQUERA (: Aug 21 1960) is a 63 year old male who presents today for leg pain.   He reports calf pain after walking 1/2 block which resolves with rest.  He thinks both calves are symptomatic but he is not sure. Denies rest pain, tissue loss, swelling.  Denies prior history of stroke/TIA. Denies focal neurologic deficits including amaurosis fugax, slurred speech, and weakness in the arms/legs.   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

## 2024-02-04 NOTE — ASSESSMENT
[FreeTextEntry1] : A/P  PAD with claudication. CLARY reduced in the RLE only.  Advise medical management at this time with walking/exercise regimen (30 minutes 3-4x week) and skin/foot care.  May consider cilostazol therapy if not improving (patient denies CHF, however, systolic dysfunction noted in allscripts, last TTE EF 43% in 2021). Will need to clarify with cardiologist, Dr. Keren Spaulding if symptoms worsen. At this time, no concern for rest pain or tissue loss.  Advise 6 month follow up with repeat CLARY/PVR.  Patient advised to call our office for earlier evaluation if new or concerning symptoms.   Asymptomatic moderate carotid artery disease.  Continue with antiplatelet and statin therapy.  Repeat carotid duplex in 6 months.   Patient seen and plan discussed with Dr. Watkins.

## 2024-02-07 ENCOUNTER — APPOINTMENT (OUTPATIENT)
Dept: CARDIOLOGY | Facility: CLINIC | Age: 64
End: 2024-02-07
Payer: MEDICAID

## 2024-02-07 ENCOUNTER — NON-APPOINTMENT (OUTPATIENT)
Age: 64
End: 2024-02-07

## 2024-02-07 VITALS — WEIGHT: 161 LBS | HEIGHT: 66 IN | BODY MASS INDEX: 25.88 KG/M2

## 2024-02-07 VITALS — OXYGEN SATURATION: 99 % | HEART RATE: 57 BPM | SYSTOLIC BLOOD PRESSURE: 157 MMHG | DIASTOLIC BLOOD PRESSURE: 84 MMHG

## 2024-02-07 DIAGNOSIS — I70.219 ATHEROSCLEROSIS OF NATIVE ARTERIES OF EXTREMITIES WITH INTERMITTENT CLAUDICATION, UNSPECIFIED EXTREMITY: ICD-10-CM

## 2024-02-07 DIAGNOSIS — I25.5 ISCHEMIC CARDIOMYOPATHY: ICD-10-CM

## 2024-02-07 PROCEDURE — 99215 OFFICE O/P EST HI 40 MIN: CPT | Mod: 25

## 2024-02-07 PROCEDURE — 93000 ELECTROCARDIOGRAM COMPLETE: CPT

## 2024-02-07 NOTE — CARDIOLOGY SUMMARY
[de-identified] : Sinus rhythm right bundle branch block [de-identified] : Ejection fraction 43%, mild-moderate mitral regurgitation in 2021

## 2024-02-07 NOTE — ASSESSMENT
[FreeTextEntry1] : CAD no anginal symptoms at the momentContinue with aspirin 81 mg daily and is on Crestor and fenofibrate.As per the patient he was having adverse effects from high-dose atorvastatin that is why he was switched to Crestor.I reviewed the lipid profile from December last year his triglycerides were 241 total cholesterol 278 HDL 37 and LDL of 194.On the next visit I plan to repeat his lipid profile and if he is intolerant to high-dose statin then I would consider putting him on injectable PCSK9 inhibitor because he is at higher cardiovascular risk because of his extremely elevated LDL.  Hypertension blood pressure is mildly elevated recommend we clinically follow-up instructed him to restrict his salt intake sleep hygiene weight management and reassess for further escalation of therapy on the next visit  PAD with significant lifestyle limiting symptoms.  I discussed with patient at length about his underlying vascular disease.  Because of convenience patient would like to have his care managed in the cardiology practice here.  I did give him an option to go back to vascular surgery but he feels that this is more convenient for him.  I recommend we start him on Pletal 50 mg twice daily I understand his ejection fraction is low but he has no clinical symptoms of congestive heart failure.  Also getting a lower extremity duplex to define the anatomy to consider interventional procedures in the future if he has significant lifestyle limiting symptoms.  His lack of ability to walk has negative impact on his overall metabolic profile so we will aggressively try to treat his PAD to improve his overall exercise capacity.

## 2024-02-07 NOTE — PHYSICAL EXAM
[Well Developed] : well developed [Normal Conjunctiva] : normal conjunctiva [Normal Venous Pressure] : normal venous pressure [Normal S1, S2] : normal S1, S2 [No Murmur] : no murmur [Diminished Pedal Pulses ___] : diminished pedal pulses [unfilled]

## 2024-02-07 NOTE — HISTORY OF PRESENT ILLNESS
[FreeTextEntry1] : 63 Yr M HTN, CAD, CABG. PAD with claudication walking 0.5-1we had CLARY PVR done on the patient few years ago that shows lower CLARY on the right side but patient was asymptomatic.  Over the last 1 year he has been having progressive claudication symptoms able to walk half to 1 block has to stop and then start walking after a few minutes of recovery.  No symptoms of claudication at rest no skin changes ulcers gangrene. Patient was recently evaluated by vascular surgery and I reviewed the records.
oral

## 2024-02-07 NOTE — REVIEW OF SYSTEMS
[Fever] : no fever [Feeling Fatigued] : feeling fatigued [Blurry Vision] : no blurred vision [Earache] : no earache [SOB] : no shortness of breath [Dyspnea on exertion] : not dyspnea during exertion [Leg Claudication] : intermittent leg claudication [Cough] : no cough [Abdominal Pain] : no abdominal pain

## 2024-02-13 ENCOUNTER — RESULT REVIEW (OUTPATIENT)
Age: 64
End: 2024-02-13

## 2024-02-13 ENCOUNTER — OUTPATIENT (OUTPATIENT)
Dept: OUTPATIENT SERVICES | Facility: HOSPITAL | Age: 64
LOS: 1 days | End: 2024-02-13
Payer: MEDICAID

## 2024-02-13 ENCOUNTER — APPOINTMENT (OUTPATIENT)
Dept: CV DIAGNOSITCS | Facility: HOSPITAL | Age: 64
End: 2024-02-13

## 2024-02-13 DIAGNOSIS — I70.219 ATHEROSCLEROSIS OF NATIVE ARTERIES OF EXTREMITIES WITH INTERMITTENT CLAUDICATION, UNSPECIFIED EXTREMITY: ICD-10-CM

## 2024-02-13 PROCEDURE — 93925 LOWER EXTREMITY STUDY: CPT | Mod: 26

## 2024-03-04 ENCOUNTER — RX RENEWAL (OUTPATIENT)
Age: 64
End: 2024-03-04

## 2024-03-06 ENCOUNTER — APPOINTMENT (OUTPATIENT)
Dept: CARDIOLOGY | Facility: CLINIC | Age: 64
End: 2024-03-06
Payer: MEDICAID

## 2024-03-06 ENCOUNTER — NON-APPOINTMENT (OUTPATIENT)
Age: 64
End: 2024-03-06

## 2024-03-06 VITALS
DIASTOLIC BLOOD PRESSURE: 86 MMHG | WEIGHT: 163.4 LBS | BODY MASS INDEX: 26.26 KG/M2 | SYSTOLIC BLOOD PRESSURE: 161 MMHG | OXYGEN SATURATION: 98 % | HEART RATE: 82 BPM | HEIGHT: 66 IN

## 2024-03-06 VITALS — DIASTOLIC BLOOD PRESSURE: 78 MMHG | SYSTOLIC BLOOD PRESSURE: 131 MMHG

## 2024-03-06 DIAGNOSIS — I25.10 ATHEROSCLEROTIC HEART DISEASE OF NATIVE CORONARY ARTERY W/OUT ANGINA PECTORIS: ICD-10-CM

## 2024-03-06 DIAGNOSIS — I73.9 PERIPHERAL VASCULAR DISEASE, UNSPECIFIED: ICD-10-CM

## 2024-03-06 PROCEDURE — 93000 ELECTROCARDIOGRAM COMPLETE: CPT

## 2024-03-06 PROCEDURE — 99214 OFFICE O/P EST MOD 30 MIN: CPT | Mod: 25

## 2024-03-06 NOTE — CARDIOLOGY SUMMARY
[de-identified] : Sinus rhythm right bundle branch block [de-identified] : Ejection fraction 43%, mild-moderate mitral regurgitation in 2021

## 2024-03-06 NOTE — HISTORY OF PRESENT ILLNESS
[FreeTextEntry1] : 63 Yr M HTN, CAD, CABG. PAD with claudication walking 0.5-1we had CLARY PVR done on the patient few years ago that shows lower CLARY on the right side but patient was asymptomatic.  Over the last 1 year he has been having progressive claudication symptoms able to walk half to 1 block has to stop and then start walking after a few minutes of recovery.  As discussed on the last visit we started the patient cilostazol and since then he reports that he has experienced significant improvement in his exercise capacity he can walk to the local store to go for his shopping does not have to stop.

## 2024-03-06 NOTE — ASSESSMENT
[FreeTextEntry1] : CAD no anginal symptoms at the momentContinue with aspirin 81 mg daily and is on Crestor and fenofibrate.As per the patient he was having adverse effects from high-dose atorvastatin that is why he was switched to Crestor.I reviewed the lipid profile from December last year his triglycerides were 241 total cholesterol 278 HDL 37 and LDL of 194.  We increased the dose of Crestor to 40 mg daily, patient has an appointment to see his primary care physician in 2 weeks recommend getting a lipid profile checked.  If lipid profile is not optimal LDL is still elevated then would consider injectable PCSK9 inhibitor  Hypertension blood pressure is at goal continue with current medical therapy  PAD with with significant improvement in symptoms with cilostazol.  Recommend continue current medical therapy if patient has worsening claudication symptoms that are lifestyle limiting or any evidence of limb ischemia then will require further workup and interventional therapies.

## 2024-03-06 NOTE — REVIEW OF SYSTEMS
[Fever] : no fever [Feeling Fatigued] : feeling fatigued [Blurry Vision] : no blurred vision [SOB] : no shortness of breath [Earache] : no earache [Leg Claudication] : intermittent leg claudication [Dyspnea on exertion] : not dyspnea during exertion [Cough] : no cough [Abdominal Pain] : no abdominal pain

## 2024-03-25 ENCOUNTER — LABORATORY RESULT (OUTPATIENT)
Age: 64
End: 2024-03-25

## 2024-03-25 ENCOUNTER — APPOINTMENT (OUTPATIENT)
Dept: INTERNAL MEDICINE | Facility: CLINIC | Age: 64
End: 2024-03-25
Payer: MEDICAID

## 2024-03-25 VITALS
HEIGHT: 66 IN | TEMPERATURE: 97.8 F | OXYGEN SATURATION: 98 % | BODY MASS INDEX: 26.03 KG/M2 | SYSTOLIC BLOOD PRESSURE: 151 MMHG | HEART RATE: 78 BPM | WEIGHT: 162 LBS | DIASTOLIC BLOOD PRESSURE: 74 MMHG

## 2024-03-25 DIAGNOSIS — S16.1XXA STRAIN OF MUSCLE, FASCIA AND TENDON AT NECK LEVEL, INITIAL ENCOUNTER: ICD-10-CM

## 2024-03-25 DIAGNOSIS — D64.9 ANEMIA, UNSPECIFIED: ICD-10-CM

## 2024-03-25 PROCEDURE — 99214 OFFICE O/P EST MOD 30 MIN: CPT

## 2024-03-25 RX ORDER — COVID-19 MOLECULAR TEST ASSAY
KIT MISCELLANEOUS
Qty: 8 | Refills: 0 | Status: COMPLETED | COMMUNITY
Start: 2022-05-20 | End: 2024-03-25

## 2024-03-25 RX ORDER — ERGOCALCIFEROL 1.25 MG/1
1.25 MG CAPSULE, LIQUID FILLED ORAL
Qty: 12 | Refills: 0 | Status: COMPLETED | COMMUNITY
Start: 2023-12-18 | End: 2024-03-25

## 2024-03-25 RX ORDER — ROSUVASTATIN CALCIUM 10 MG/1
10 TABLET, FILM COATED ORAL
Qty: 90 | Refills: 0 | Status: COMPLETED | COMMUNITY
Start: 2023-12-12

## 2024-03-25 RX ORDER — TRAMADOL HYDROCHLORIDE 50 MG/1
50 TABLET, COATED ORAL
Qty: 14 | Refills: 0 | Status: COMPLETED | COMMUNITY
Start: 2024-03-14

## 2024-03-25 NOTE — PHYSICAL EXAM
[No Acute Distress] : no acute distress [Well-Appearing] : well-appearing [Normal Voice/Communication] : normal voice/communication [No Respiratory Distress] : no respiratory distress  [No Accessory Muscle Use] : no accessory muscle use [Clear to Auscultation] : lungs were clear to auscultation bilaterally [No Edema] : there was no peripheral edema [No Carotid Bruits] : no carotid bruits [No Rash] : no rash [Coordination Grossly Intact] : coordination grossly intact [No Focal Deficits] : no focal deficits [Normal Gait] : normal gait [Normal] : affect was normal and insight and judgment were intact [de-identified] : stiffness with neck ROM  [de-identified] : weak right pedal pulses

## 2024-03-25 NOTE — ASSESSMENT
[FreeTextEntry1] : --- Diabetes: Well controlled when adherent with medication and dietary factors.  Last A1C was 6.8 Declines CDE eval. Declines to monitor BG at home. UTD ophthalmology, scheduled for yearly f/u.  Podiatry yearly Taking glimepiride regularly, metformin once daily  On statin, not taking regularly. Recheck lipids today. A1C with blood work.  Dietary and medication adherence reinforced PPSV 2012  Peripheral neuropathy: Discussed importance of BG control. Feeling much better (?improved BG)  Hypertension: Controlled when adherent with medication Restart prescribed meds (reports not taking carvedilol).  Monitor home readings and f/u if remains elevated.   CAD, CHF: Abnormal nuclear stress test Denies symptoms Continues regular f/u with cardiology, no further testing at this time Statin adherence encouraged   Hyperlipidemia: Check lipids with labs today   Proteinuria, elevated creatinine: Back on SGLT2, importance of diabetes control discussed Recheck today, discussed nephrology f/u  Blurred vision: Resolved Continued ophthalmology f/u  Bilateral hip and lower leg pain with prolonged walking: improved s/p PT  diminished pedal pulses: significant PAD on vascular testing, now on Pletal.  Improved leg pain and improved exercise tolerance   Bilateral anterior shoulder pain: S/p injection with improvement   Neck pain/stiffness: Saw pain management specialist, given tramadol with no impact Will trial muscle relaxant + heat PT referral provided as well as it improves  F/u if no improvement   Bilateral wrist pain: ?OA/CTS/radiculopathy declines hand/ortho eval at this time, prefers to work on home strengthening activities, will f/u if not improving   Former smoker: 20-30 pack year history, quit 10 yr ago. Lung cancer screening discussed, would like to proceed Order provided    UTD colo - 3/2020, repeat 10 years UTD dental Skin cancer screening advised, done 3/2020 tdap UTD - 2022 Shingrix completed Flu shot done  F/u 3 months.

## 2024-03-25 NOTE — HISTORY OF PRESENT ILLNESS
[de-identified] : 61 y.o. male, PMHx hypertension, diabetes, CAD, CHD, hyperlipidemia, proteinuria. Presents for follow up.   Developed severe left shoulder pain.  Had injection with good effect.   About 2 months ago developed severe neck pain and elevated blood pressure.   Saw a pain specialist for the neck and prescribed Tramadol, which has not really helped.   Spoke with a nurse friend that he knows who recommended that he increase his amlodipine and take lisinopril (stopped his losartan).     Saw vascular for calf/foot pain.  Significant PAD.  Reports improved symptoms with addition of Pletal.  Statin dose also increased pre cardiology as not achieving goal LDL.   Since neck pain started has been having weakness in the arms and hands.

## 2024-04-26 DIAGNOSIS — R80.9 PROTEINURIA, UNSPECIFIED: ICD-10-CM

## 2024-04-26 LAB
25(OH)D3 SERPL-MCNC: 35.4 NG/ML
ALBUMIN SERPL ELPH-MCNC: 4 G/DL
ALP BLD-CCNC: 101 U/L
ALT SERPL-CCNC: 13 U/L
ANION GAP SERPL CALC-SCNC: 13 MMOL/L
APPEARANCE: CLEAR
AST SERPL-CCNC: 15 U/L
BASOPHILS # BLD AUTO: 0.05 K/UL
BASOPHILS NFR BLD AUTO: 0.6 %
BILIRUB SERPL-MCNC: 0.2 MG/DL
BILIRUBIN URINE: NEGATIVE
BLOOD URINE: NEGATIVE
BUN SERPL-MCNC: 26 MG/DL
CALCIUM SERPL-MCNC: 9.7 MG/DL
CHLORIDE SERPL-SCNC: 102 MMOL/L
CHOLEST SERPL-MCNC: 314 MG/DL
CO2 SERPL-SCNC: 23 MMOL/L
COLOR: NORMAL
CREAT SERPL-MCNC: 1.25 MG/DL
CREAT SPEC-SCNC: 277 MG/DL
EGFR: 64 ML/MIN/1.73M2
EOSINOPHIL # BLD AUTO: 0.1 K/UL
EOSINOPHIL NFR BLD AUTO: 1.2 %
ESTIMATED AVERAGE GLUCOSE: 177 MG/DL
FERRITIN SERPL-MCNC: 183 NG/ML
FOLATE SERPL-MCNC: 18.3 NG/ML
GLUCOSE QUALITATIVE U: NEGATIVE MG/DL
GLUCOSE SERPL-MCNC: 102 MG/DL
HBA1C MFR BLD HPLC: 7.8 %
HCT VFR BLD CALC: 40.5 %
HDLC SERPL-MCNC: 40 MG/DL
HGB BLD-MCNC: 13.3 G/DL
IMM GRANULOCYTES NFR BLD AUTO: 0.2 %
IRON SATN MFR SERPL: 15 %
IRON SERPL-MCNC: 48 UG/DL
KETONES URINE: NEGATIVE MG/DL
LDLC SERPL CALC-MCNC: 194 MG/DL
LEUKOCYTE ESTERASE URINE: NEGATIVE
LYMPHOCYTES # BLD AUTO: 2.37 K/UL
LYMPHOCYTES NFR BLD AUTO: 28.6 %
MAN DIFF?: NORMAL
MCHC RBC-ENTMCNC: 27.5 PG
MCHC RBC-ENTMCNC: 32.8 GM/DL
MCV RBC AUTO: 83.9 FL
MICROALBUMIN 24H UR DL<=1MG/L-MCNC: 436.7 MG/DL
MICROALBUMIN/CREAT 24H UR-RTO: 1577 MG/G
MONOCYTES # BLD AUTO: 0.61 K/UL
MONOCYTES NFR BLD AUTO: 7.4 %
NEUTROPHILS # BLD AUTO: 5.13 K/UL
NEUTROPHILS NFR BLD AUTO: 62 %
NITRITE URINE: NEGATIVE
NONHDLC SERPL-MCNC: 274 MG/DL
PH URINE: 5
PLATELET # BLD AUTO: 303 K/UL
POTASSIUM SERPL-SCNC: 4.4 MMOL/L
PROT SERPL-MCNC: 8.3 G/DL
PROTEIN URINE: 300 MG/DL
RBC # BLD: 4.83 M/UL
RBC # FLD: 14.2 %
SODIUM SERPL-SCNC: 138 MMOL/L
SPECIFIC GRAVITY URINE: >1.03
TIBC SERPL-MCNC: 328 UG/DL
TRIGL SERPL-MCNC: 392 MG/DL
UIBC SERPL-MCNC: 280 UG/DL
UROBILINOGEN URINE: 0.2 MG/DL
VIT B12 SERPL-MCNC: 764 PG/ML
WBC # FLD AUTO: 8.28 K/UL

## 2024-05-07 ENCOUNTER — RX RENEWAL (OUTPATIENT)
Age: 64
End: 2024-05-07

## 2024-05-16 ENCOUNTER — RX RENEWAL (OUTPATIENT)
Age: 64
End: 2024-05-16

## 2024-05-30 ENCOUNTER — APPOINTMENT (OUTPATIENT)
Dept: OPHTHALMOLOGY | Facility: CLINIC | Age: 64
End: 2024-05-30
Payer: MEDICAID

## 2024-05-30 ENCOUNTER — NON-APPOINTMENT (OUTPATIENT)
Age: 64
End: 2024-05-30

## 2024-05-30 PROCEDURE — 92014 COMPRE OPH EXAM EST PT 1/>: CPT

## 2024-06-05 ENCOUNTER — APPOINTMENT (OUTPATIENT)
Dept: CARDIOLOGY | Facility: CLINIC | Age: 64
End: 2024-06-05

## 2024-06-06 ENCOUNTER — RX RENEWAL (OUTPATIENT)
Age: 64
End: 2024-06-06

## 2024-06-19 ENCOUNTER — APPOINTMENT (OUTPATIENT)
Dept: INTERNAL MEDICINE | Facility: CLINIC | Age: 64
End: 2024-06-19
Payer: MEDICAID

## 2024-06-19 VITALS
HEIGHT: 66 IN | BODY MASS INDEX: 25.39 KG/M2 | SYSTOLIC BLOOD PRESSURE: 96 MMHG | TEMPERATURE: 97.2 F | DIASTOLIC BLOOD PRESSURE: 60 MMHG | OXYGEN SATURATION: 97 % | HEART RATE: 50 BPM | WEIGHT: 158 LBS

## 2024-06-19 DIAGNOSIS — E11.9 TYPE 2 DIABETES MELLITUS W/OUT COMPLICATIONS: ICD-10-CM

## 2024-06-19 DIAGNOSIS — R06.2 WHEEZING: ICD-10-CM

## 2024-06-19 DIAGNOSIS — I10 ESSENTIAL (PRIMARY) HYPERTENSION: ICD-10-CM

## 2024-06-19 DIAGNOSIS — Z86.19 PERSONAL HISTORY OF OTHER INFECTIOUS AND PARASITIC DISEASES: ICD-10-CM

## 2024-06-19 DIAGNOSIS — E78.5 HYPERLIPIDEMIA, UNSPECIFIED: ICD-10-CM

## 2024-06-19 LAB — HBA1C MFR BLD HPLC: 6.1

## 2024-06-19 PROCEDURE — 99214 OFFICE O/P EST MOD 30 MIN: CPT

## 2024-06-19 RX ORDER — NEBULIZER ACCESSORIES
KIT MISCELLANEOUS
Qty: 1 | Refills: 1 | Status: ACTIVE | OUTPATIENT
Start: 2024-06-19

## 2024-06-19 RX ORDER — BUDESONIDE 0.5 MG/2ML
0.5 INHALANT ORAL
Qty: 2 | Refills: 5 | Status: ACTIVE | COMMUNITY
Start: 2024-06-19 | End: 1900-01-01

## 2024-06-19 RX ORDER — ISOSORBIDE MONONITRATE 30 MG/1
30 TABLET, EXTENDED RELEASE ORAL
Qty: 90 | Refills: 3 | Status: COMPLETED | COMMUNITY
Start: 2021-04-05 | End: 2024-06-19

## 2024-06-19 RX ORDER — ERGOCALCIFEROL 1.25 MG/1
1.25 MG CAPSULE, LIQUID FILLED ORAL
Qty: 12 | Refills: 0 | Status: COMPLETED | COMMUNITY
Start: 2023-12-13 | End: 2024-06-19

## 2024-06-19 RX ORDER — TIZANIDINE 2 MG/1
2 TABLET ORAL
Qty: 20 | Refills: 0 | Status: COMPLETED | COMMUNITY
Start: 2024-03-25 | End: 2024-06-19

## 2024-06-19 RX ORDER — AMLODIPINE BESYLATE 5 MG/1
5 TABLET ORAL
Qty: 90 | Refills: 3 | Status: COMPLETED | COMMUNITY
Start: 2020-02-27 | End: 2024-06-19

## 2024-06-19 RX ORDER — CILOSTAZOL 50 MG/1
50 TABLET ORAL
Qty: 60 | Refills: 6 | Status: COMPLETED | COMMUNITY
Start: 2024-02-07 | End: 2024-06-19

## 2024-06-19 RX ORDER — BLOOD PRESSURE TEST KIT-LARGE
KIT MISCELLANEOUS
Qty: 1 | Refills: 0 | Status: ACTIVE | COMMUNITY
Start: 2024-06-19 | End: 1900-01-01

## 2024-06-19 RX ORDER — ALBUTEROL SULFATE 2.5 MG/3ML
(2.5 MG/3ML) SOLUTION RESPIRATORY (INHALATION)
Qty: 1 | Refills: 1 | Status: ACTIVE | COMMUNITY
Start: 2024-06-19 | End: 1900-01-01

## 2024-06-19 NOTE — HISTORY OF PRESENT ILLNESS
[de-identified] : 61 y.o. male, PMHx hypertension, diabetes, CAD, CHD, hyperlipidemia, proteinuria. Presents for follow up.    Cough for 3 weeks.  Lots of wheeze and chest congestion to start.  Thinks still has a slight wheeze but much better, still with persistent dry cough particularly when talking.  Breathing feels much better.   Very fatigued at the start, that too has improved just over the past few days.  Has been using a nebulizer that someone gave him, not sure what the liquid is (albuterol vs. saline?).    Has been only taking metformin 1,000mg daily, furosemide 20mg daily, losartan 100mg.   Sometimes takes glimepiride (4mg), but states just randomly.  Notes neuropathy in the feet greatly improved since glucose improved.   Ophthalmology about 1 month ago, reports no issues.   Needs to scheduled podiatry.   Has been very careful with his diet.   Did not restart the cholesterol medication.   Did not scheduled with nephrology  Shoulder has been feeling well since injection, no pain, no ROM impairment.  Left hand weakness and wrist pain ongoing but states improved.   Saw vascular for calf/foot pain - significant PAD.  Reports improved symptoms with addition of Pletal, but now stopped as not sure which medication is causing nausea and headache.    Concerned about taking other meds (rosuvastatin, fenofibrate, isosorbide, pletal, amlodipine, vitamin d, vitamin b12, as one of them seemed to cause nausea and headache.   Feels well with the metformin, losartan, carvedilol, furosemide, aspirin, B12,

## 2024-06-19 NOTE — ASSESSMENT
[FreeTextEntry1] : --- Cough: Given severe fatigue that he was feeling, recommend that he go for x-ray Continues nebulizer treatments once or twice daily, Rx sent in for albuterol + budesonide, until cough resolved  Diabetes: Well controlled when adherent with medication and dietary factors.  Recent A1C 6.1 Declines CDE eval. Has been monitoring BG at home, 90 this morning.   Taking 1000mg metformin daily, no evening dose and randomly takes glimepiride. Encouraged to add in additional 1000mg of metformin rather than glimepiride if needed.   UTD ophthalmology - last month (5/2024) reports 1 yr f/u advised as no issues.    Yearly podiatry encouraged.   Not taking statin.  Recheck lipids today. Dietary and medication adherence reinforced PPSV 2012  Peripheral neuropathy: Discussed importance of BG control. Feeling much better (?improved BG)  Hypertension: Controlled when adherent with medication - currently taking losartan, carvedilol, furosemide.   Encouraged to monitor home readings  CAD, CHF, hyperlipidemia: Abnormal nuclear stress test Denies symptoms Continues regular f/u with cardiology but missed most recent appointment d/t illness, plans to reschedule  Statin adherence encouraged, recheck labs today   Proteinuria, elevated creatinine: Declines to take SGLT2 (thinks causes upset stomach) Taking ARB (losartan 100mg) Recheck today, discussed nephrology f/u  Blurred vision: Resolved Continues ophthalmology f/u  Bilateral hip and lower leg pain with prolonged walking: improved s/p PT  Diminished pedal pulses: significant PAD on vascular testing.   improved claudication symptoms with Pletal.   Self d/c'd Pletal, does not want to restart.  Taking daily low dose aspirin Scheduled for vascular follow up in 2 months.     Bilateral anterior shoulder pain: S/p injection with improvement   Neck pain/stiffness: Intermittent, currently feels well   Bilateral wrist pain: ?OA/CTS/radiculopathy declines hand/ortho eval at this time as states it is improving, prefers to work on home strengthening activities, will f/u if not improving   Former smoker: 20-30 pack year history, quit 10 yr ago. Lung cancer screening discussed, would like to proceed Order provided    UTD colo - 3/2020, repeat 10 years UTD dental Skin cancer screening advised, done 3/2020 tdap UTD - 2022 Shingrix completed Will get PCV20 next year Flu shot done  Discussed importance of medication adherence again.  Given instructions to add back statin and monitor for side effects x 1 week, if feeling well will add back fenofibrate.  Advised to reach out if not tolerating so that we can try an alternative.

## 2024-06-19 NOTE — PHYSICAL EXAM
[No Acute Distress] : no acute distress [Well-Appearing] : well-appearing [Normal Voice/Communication] : normal voice/communication [No Respiratory Distress] : no respiratory distress  [No Accessory Muscle Use] : no accessory muscle use [Clear to Auscultation] : lungs were clear to auscultation bilaterally [No Carotid Bruits] : no carotid bruits [No Edema] : there was no peripheral edema [No Rash] : no rash [Coordination Grossly Intact] : coordination grossly intact [No Focal Deficits] : no focal deficits [Normal Gait] : normal gait [Normal] : affect was normal and insight and judgment were intact [de-identified] : weak right pedal pulses  [de-identified] : stiffness with neck ROM

## 2024-06-19 NOTE — REVIEW OF SYSTEMS
[Dizziness] : no dizziness [Fainting] : no fainting [Confusion] : no confusion [Unsteady Walk] : no ataxia [Negative] : Genitourinary [FreeTextEntry2] : see HPI  [FreeTextEntry6] : see HPI [FreeTextEntry7] : nausea with some meds  [FreeTextEntry9] : see HPi  [de-identified] : headache with some meds

## 2024-07-02 ENCOUNTER — RX RENEWAL (OUTPATIENT)
Age: 64
End: 2024-07-02

## 2024-07-02 DIAGNOSIS — M25.532 PAIN IN LEFT WRIST: ICD-10-CM

## 2024-07-02 DIAGNOSIS — E78.5 HYPERLIPIDEMIA, UNSPECIFIED: ICD-10-CM

## 2024-07-02 LAB
ALBUMIN SERPL ELPH-MCNC: 3.9 G/DL
ALP BLD-CCNC: 112 U/L
ALT SERPL-CCNC: 10 U/L
ANION GAP SERPL CALC-SCNC: 15 MMOL/L
AST SERPL-CCNC: 11 U/L
BASOPHILS # BLD AUTO: 0.04 K/UL
BASOPHILS NFR BLD AUTO: 0.5 %
BILIRUB SERPL-MCNC: 0.3 MG/DL
BUN SERPL-MCNC: 19 MG/DL
CALCIUM SERPL-MCNC: 9.6 MG/DL
CHLORIDE SERPL-SCNC: 103 MMOL/L
CHOLEST SERPL-MCNC: 311 MG/DL
CO2 SERPL-SCNC: 22 MMOL/L
CREAT SERPL-MCNC: 1.62 MG/DL
CREAT SPEC-SCNC: 215 MG/DL
EGFR: 47 ML/MIN/1.73M2
EOSINOPHIL # BLD AUTO: 0.2 K/UL
EOSINOPHIL NFR BLD AUTO: 2.6 %
GLUCOSE SERPL-MCNC: 139 MG/DL
HCT VFR BLD CALC: 36.1 %
HDLC SERPL-MCNC: 39 MG/DL
HGB BLD-MCNC: 11.7 G/DL
IMM GRANULOCYTES NFR BLD AUTO: 0.1 %
LDLC SERPL CALC-MCNC: 222 MG/DL
LYMPHOCYTES # BLD AUTO: 2.27 K/UL
LYMPHOCYTES NFR BLD AUTO: 29.1 %
MAN DIFF?: NORMAL
MCHC RBC-ENTMCNC: 28.4 PG
MCHC RBC-ENTMCNC: 32.4 GM/DL
MCV RBC AUTO: 87.6 FL
MICROALBUMIN 24H UR DL<=1MG/L-MCNC: 222.7 MG/DL
MICROALBUMIN/CREAT 24H UR-RTO: 1038 MG/G
MONOCYTES # BLD AUTO: 0.46 K/UL
MONOCYTES NFR BLD AUTO: 5.9 %
NEUTROPHILS # BLD AUTO: 4.81 K/UL
NEUTROPHILS NFR BLD AUTO: 61.8 %
NONHDLC SERPL-MCNC: 272 MG/DL
PLATELET # BLD AUTO: 269 K/UL
POTASSIUM SERPL-SCNC: 4.7 MMOL/L
PROT SERPL-MCNC: 8 G/DL
RBC # BLD: 4.12 M/UL
RBC # FLD: 15.9 %
SODIUM SERPL-SCNC: 140 MMOL/L
TRIGL SERPL-MCNC: 247 MG/DL
WBC # FLD AUTO: 7.79 K/UL

## 2024-07-02 RX ORDER — PRAVASTATIN SODIUM 20 MG/1
20 TABLET ORAL
Qty: 90 | Refills: 3 | Status: ACTIVE | COMMUNITY
Start: 2024-07-02 | End: 1900-01-01

## 2024-07-18 ENCOUNTER — RESULT REVIEW (OUTPATIENT)
Age: 64
End: 2024-07-18

## 2024-07-22 ENCOUNTER — NON-APPOINTMENT (OUTPATIENT)
Age: 64
End: 2024-07-22

## 2024-07-25 ENCOUNTER — APPOINTMENT (OUTPATIENT)
Dept: INTERNAL MEDICINE | Facility: CLINIC | Age: 64
End: 2024-07-25
Payer: MEDICAID

## 2024-07-25 VITALS
SYSTOLIC BLOOD PRESSURE: 135 MMHG | BODY MASS INDEX: 25.07 KG/M2 | HEIGHT: 66 IN | TEMPERATURE: 97.7 F | WEIGHT: 156 LBS | OXYGEN SATURATION: 97 % | DIASTOLIC BLOOD PRESSURE: 85 MMHG | HEART RATE: 71 BPM

## 2024-07-25 DIAGNOSIS — I25.10 ATHEROSCLEROTIC HEART DISEASE OF NATIVE CORONARY ARTERY W/OUT ANGINA PECTORIS: ICD-10-CM

## 2024-07-25 DIAGNOSIS — D64.9 ANEMIA, UNSPECIFIED: ICD-10-CM

## 2024-07-25 DIAGNOSIS — N17.9 ACUTE KIDNEY FAILURE, UNSPECIFIED: ICD-10-CM

## 2024-07-25 PROBLEM — R05.3 CHRONIC COUGH: Status: ACTIVE | Noted: 2024-07-25

## 2024-07-25 PROCEDURE — 99495 TRANSJ CARE MGMT MOD F2F 14D: CPT

## 2024-07-25 PROCEDURE — 36415 COLL VENOUS BLD VENIPUNCTURE: CPT

## 2024-07-25 RX ORDER — ISOSORBIDE DINITRATE 30 MG/1
30 TABLET ORAL DAILY
Qty: 90 | Refills: 3 | Status: ACTIVE | COMMUNITY
Start: 2024-07-25 | End: 1900-01-01

## 2024-07-25 RX ORDER — BLOOD SUGAR DIAGNOSTIC
STRIP MISCELLANEOUS
Qty: 60 | Refills: 3 | Status: ACTIVE | COMMUNITY
Start: 2024-07-25 | End: 1900-01-01

## 2024-07-25 NOTE — HISTORY OF PRESENT ILLNESS
[Post-hospitalization from ___ Hospital] : Post-hospitalization from [unfilled] Hospital [Discharge Summary] : discharge summary [Pertinent Labs] : pertinent labs [Discharge Med List] : discharge medication list [Med Reconciliation] : medication reconciliation has been completed [Patient Contacted By: ____] : and contacted by [unfilled]

## 2024-07-26 LAB
ALBUMIN SERPL ELPH-MCNC: 3.9 G/DL
ALP BLD-CCNC: 93 U/L
ALT SERPL-CCNC: 10 U/L
ANION GAP SERPL CALC-SCNC: 15 MMOL/L
AST SERPL-CCNC: 13 U/L
BILIRUB SERPL-MCNC: 0.3 MG/DL
BUN SERPL-MCNC: 30 MG/DL
CALCIUM SERPL-MCNC: 9.2 MG/DL
CHLORIDE SERPL-SCNC: 104 MMOL/L
CO2 SERPL-SCNC: 19 MMOL/L
CREAT SERPL-MCNC: 1.93 MG/DL
EGFR: 38 ML/MIN/1.73M2
ESTIMATED AVERAGE GLUCOSE: 180 MG/DL
FERRITIN SERPL-MCNC: 119 NG/ML
GLUCOSE SERPL-MCNC: 112 MG/DL
HBA1C MFR BLD HPLC: 7.9 %
HCT VFR BLD CALC: 38.8 %
HGB BLD-MCNC: 12.2 G/DL
IRON SATN MFR SERPL: 14 %
IRON SERPL-MCNC: 47 UG/DL
MCHC RBC-ENTMCNC: 27.9 PG
MCHC RBC-ENTMCNC: 31.4 GM/DL
MCV RBC AUTO: 88.8 FL
NT-PROBNP SERPL-MCNC: 5377 PG/ML
PLATELET # BLD AUTO: 230 K/UL
POTASSIUM SERPL-SCNC: 4.8 MMOL/L
PROT SERPL-MCNC: 7.7 G/DL
RBC # BLD: 4.37 M/UL
RBC # FLD: 15.8 %
SODIUM SERPL-SCNC: 138 MMOL/L
TIBC SERPL-MCNC: 347 UG/DL
UIBC SERPL-MCNC: 300 UG/DL
WBC # FLD AUTO: 7.15 K/UL

## 2024-07-26 RX ORDER — EMPAGLIFLOZIN 10 MG/1
10 TABLET, FILM COATED ORAL DAILY
Qty: 90 | Refills: 3 | Status: ACTIVE | COMMUNITY
Start: 2024-07-26 | End: 1900-01-01

## 2024-07-30 ENCOUNTER — NON-APPOINTMENT (OUTPATIENT)
Age: 64
End: 2024-07-30

## 2024-07-30 ENCOUNTER — APPOINTMENT (OUTPATIENT)
Dept: HEART FAILURE | Facility: CLINIC | Age: 64
End: 2024-07-30
Payer: MEDICAID

## 2024-07-30 VITALS
DIASTOLIC BLOOD PRESSURE: 82 MMHG | HEART RATE: 74 BPM | OXYGEN SATURATION: 96 % | BODY MASS INDEX: 25.79 KG/M2 | HEIGHT: 66 IN | SYSTOLIC BLOOD PRESSURE: 139 MMHG | WEIGHT: 160.5 LBS

## 2024-07-30 DIAGNOSIS — E11.9 TYPE 2 DIABETES MELLITUS W/OUT COMPLICATIONS: ICD-10-CM

## 2024-07-30 PROCEDURE — 93000 ELECTROCARDIOGRAM COMPLETE: CPT

## 2024-07-30 PROCEDURE — 36415 COLL VENOUS BLD VENIPUNCTURE: CPT

## 2024-07-30 PROCEDURE — 99205 OFFICE O/P NEW HI 60 MIN: CPT

## 2024-07-30 PROCEDURE — G2211 COMPLEX E/M VISIT ADD ON: CPT | Mod: NC

## 2024-07-30 PROCEDURE — 99215 OFFICE O/P EST HI 40 MIN: CPT | Mod: 25

## 2024-07-30 RX ORDER — HYDRALAZINE HYDROCHLORIDE 50 MG/1
50 TABLET ORAL 3 TIMES DAILY
Qty: 90 | Refills: 2 | Status: ACTIVE | COMMUNITY
Start: 2024-07-30 | End: 1900-01-01

## 2024-07-30 RX ORDER — GLIMEPIRIDE 4 MG/1
4 TABLET ORAL DAILY
Refills: 0 | Status: ACTIVE | COMMUNITY
Start: 2024-07-30

## 2024-07-30 RX ORDER — BLOOD-GLUCOSE METER
KIT MISCELLANEOUS
Qty: 1 | Refills: 0 | Status: ACTIVE | COMMUNITY
Start: 2024-07-30 | End: 1900-01-01

## 2024-07-30 NOTE — SOCIAL HISTORY
[TextEntry] : Prior tobacco use (1 ppd x 10 years; quit 2011). Seldom EtOH. 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 Lives with wife.

## 2024-07-30 NOTE — HISTORY OF PRESENT ILLNESS
[FreeTextEntry1] : Mr. Smyth is a 62 y/o M w/ h/o CAD s/p CABG (2011), HL, NIDDM (A1c 7.4 7/24), HFrEF (EF 30-35%, LVEDD 6 cm), HTN, PAD (medically managed), prior tobacco use (10 pk-yr; quit 2011) who presents for establishment of care. Also followed by Dr. Keren Spaulding (primary cardiologist).  Per patient, HPI began in 2011 when was having indigestion and was admitted. Had workup which revealed triple vessel coronary disease and underwent CABG by Dr. Maurice 2011. Hadn't been hospitalized until this past July.   Has been seeing Dr. Spaulding since 2022 and has been followed by our faculty practice since 2011.   Was well until December 2023 when started having cough and weakness. Felt it was due to medication so had intermittently stopped them without improvement. Of note, documented cough to lisinopril.   Was recently hospitalized from 7/17-7/21/24 for decompensated heart failure. EF noted to reduce to 30-35% (from 43%). Troponins were negative and BNP was 5641. CT chest non-con showed paraseptal emphysema, b/l pleural effusions. Underwent angiogram which showed patent grafts and RHC showed normal filling pressures and preserved cardiac output. Hospital course c/b acute kidney injury due to contrast (Cr peaked at 1.61 from 1.25). Underwent diuresis and was switched to Entresto.   Reports cough has been persistent, worse when tries to talk. Uses 2-3 flat pillows w/o PND. Sleeps on his side. Denies bendopnea. Adherent to sodium diet.   ET limited to 1 block due to bilateral calf fatigue; improved with rest. Has appointment with Dr. Watkins (noted to have R sided PAD). Of note had not been taking Pravastatin (possibly intolerant to other statins). Lipid panel has been markedly elevated (Tchol 311, ; 6/19/24). Able to go up 13 stairs w/o stopping.   Monitors weight. Weight has been 160 pounds. Doesn't check BP at home.

## 2024-07-30 NOTE — CARDIOLOGY SUMMARY
[de-identified] : 7/30/24 - NSR, first deg AV block, RBBB  [de-identified] : 7/18/24 - EF 25-30% (read as 30-35%), LVEDD 6 cm, pleural effusion, mod MR,  msec, E/e' 22, LVOT VTI 9.5, IVC 1.6 cm [de-identified] : 7/19/24 - LM mild athero, LAD , LCx severe atherosclerosis, RCA ; LIMA-mLAD patent, SVG-OM1 patent, SVG (jump) RPDA patent; /68/88, RA 5, RV 41/7, PA 41/15/26, PCWP 14 (v 17), CO/CI 4.42/2.4

## 2024-07-30 NOTE — ASSESSMENT
[FreeTextEntry1] : Mr. Smyth is a 62 y/o M w/ h/o CAD s/p CABG (2011), HL, NIDDM (A1c 7.4 7/24), HFrEF (EF 30-35%, LVEDD 6 cm), HTN, PAD (medically managed), prior tobacco use (10 pk-yr; quit 2011) who presents for establishment of care. Currently reports NYHA class II-III symptoms and is mildly overloaded, normotensive with persistent cough which may be related to mild overload as well as medication-induced.   # HFrEF/ICM - on lasix 40 mg daily; instructed to take 40 mg twice/day for 2 days then daily with extra as needed for weight gain of 2-3 pounds - suspect cough is medication-related as had when he was on losartan and now; will d/c Entresto and start hydral 50 mg q8h  - c/w coreg 25 mg twice/day; HR above goal; possible Corlanor consideration - c/w imdur 30 mg daily - c/w Jardiance 10 mg daily - repeat labs in office  - counseled about disease process - maintain log of weight/BP  # CAD - s/p CABG - c/w ASA 81 mg daily  - lipid panel poorly controlled; hadn't been taking Pravastatin and intolerant to other statins; would be a good candidate for PCSK9i; will d/w Dr. Spaulding  # DM - A1c 7.4 - needs better glycemic control - on Jardiance, glimepiride  - previously on metformin  RTC NP 4 weeks, me in 12 weeks

## 2024-07-30 NOTE — PHYSICAL EXAM
[Well Developed] : well developed [Well Nourished] : well nourished [No Acute Distress] : no acute distress [Normal Conjunctiva] : normal conjunctiva [Normal Venous Pressure] : normal venous pressure [No Carotid Bruit] : no carotid bruit [Normal S1, S2] : normal S1, S2 [No Murmur] : no murmur [No Rub] : no rub [No Gallop] : no gallop [Good Air Entry] : good air entry [No Respiratory Distress] : no respiratory distress  [Soft] : abdomen soft [Non Tender] : non-tender [No Masses/organomegaly] : no masses/organomegaly [Normal Bowel Sounds] : normal bowel sounds [Normal Gait] : normal gait [No Edema] : no edema [No Cyanosis] : no cyanosis [No Clubbing] : no clubbing [No Varicosities] : no varicosities [No Rash] : no rash [No Skin Lesions] : no skin lesions [Moves all extremities] : moves all extremities [No Focal Deficits] : no focal deficits [Normal Speech] : normal speech [Alert and Oriented] : alert and oriented [Normal memory] : normal memory [de-identified] : JVP approx 8-10 with mild-mod HJR [de-identified] : RRR [de-identified] : mild crackles

## 2024-07-31 ENCOUNTER — NON-APPOINTMENT (OUTPATIENT)
Age: 64
End: 2024-07-31

## 2024-07-31 LAB
ALBUMIN SERPL ELPH-MCNC: 3.8 G/DL
ALP BLD-CCNC: 76 U/L
ALT SERPL-CCNC: 9 U/L
ANION GAP SERPL CALC-SCNC: 16 MMOL/L
AST SERPL-CCNC: 15 U/L
BILIRUB SERPL-MCNC: 0.4 MG/DL
BUN SERPL-MCNC: 26 MG/DL
CALCIUM SERPL-MCNC: 9 MG/DL
CHLORIDE SERPL-SCNC: 101 MMOL/L
CHOLEST SERPL-MCNC: 207 MG/DL
CO2 SERPL-SCNC: 19 MMOL/L
CREAT SERPL-MCNC: 2.01 MG/DL
EGFR: 36 ML/MIN/1.73M2
GLUCOSE SERPL-MCNC: 57 MG/DL
HDLC SERPL-MCNC: 39 MG/DL
LDLC SERPL CALC-MCNC: 142 MG/DL
NONHDLC SERPL-MCNC: 169 MG/DL
NT-PROBNP SERPL-MCNC: 6280 PG/ML
POTASSIUM SERPL-SCNC: 4.7 MMOL/L
PROT SERPL-MCNC: 7.6 G/DL
SODIUM SERPL-SCNC: 137 MMOL/L
TRIGL SERPL-MCNC: 146 MG/DL

## 2024-08-01 ENCOUNTER — APPOINTMENT (OUTPATIENT)
Dept: PULMONOLOGY | Facility: CLINIC | Age: 64
End: 2024-08-01
Payer: MEDICAID

## 2024-08-01 VITALS
SYSTOLIC BLOOD PRESSURE: 145 MMHG | HEIGHT: 65 IN | DIASTOLIC BLOOD PRESSURE: 77 MMHG | WEIGHT: 150 LBS | OXYGEN SATURATION: 95 % | HEART RATE: 83 BPM | BODY MASS INDEX: 24.99 KG/M2

## 2024-08-01 DIAGNOSIS — R05.3 CHRONIC COUGH: ICD-10-CM

## 2024-08-01 DIAGNOSIS — Z87.891 PERSONAL HISTORY OF NICOTINE DEPENDENCE: ICD-10-CM

## 2024-08-01 PROCEDURE — 76604 US EXAM CHEST: CPT

## 2024-08-01 PROCEDURE — 99204 OFFICE O/P NEW MOD 45 MIN: CPT

## 2024-08-01 RX ORDER — FLUTICASONE PROPIONATE 50 UG/1
50 SPRAY, METERED NASAL TWICE DAILY
Qty: 1 | Refills: 5 | Status: ACTIVE | COMMUNITY
Start: 2024-08-01 | End: 1900-01-01

## 2024-08-01 RX ORDER — BENZONATATE 100 MG/1
100 CAPSULE ORAL
Qty: 1 | Refills: 3 | Status: ACTIVE | COMMUNITY
Start: 2024-08-01 | End: 1900-01-01

## 2024-08-01 NOTE — PHYSICAL EXAM
[No Acute Distress] : no acute distress [Erythema] : erythema [Normal Appearance] : normal appearance [No Neck Mass] : no neck mass [Normal Rate/Rhythm] : normal rate/rhythm [Normal S1, S2] : normal s1, s2 [No Murmurs] : no murmurs [No Resp Distress] : no resp distress [Clear to Auscultation Bilaterally] : clear to auscultation bilaterally [No Abnormalities] : no abnormalities [No Clubbing] : no clubbing [No Cyanosis] : no cyanosis [No Edema] : no edema [Oriented x3] : oriented x3 [Normal Affect] : normal affect

## 2024-08-01 NOTE — HISTORY OF PRESENT ILLNESS
[Former] : former [>= 20 pack years] : >= 20 pack years [TextBox_4] : Mr. Smyth is a 62 y/o M w/ h/o CAD s/p CABG (2011), HL, NIDDM, HFrEF (EF 30-35%), HTN, PAD (medically managed), prior tobacco use (10 pk-yr; quit 2011) who presents for evaluation of cough.    Was recently hospitalized from 7/17-7/21/24 for decompensated heart failure. EF noted to reduce to 30-35% (from 43%). Troponins were negative and BNP was 5641. CT chest non-con showed paraseptal emphysema, b/l pleural effusions. Underwent angiogram which showed patent grafts and RHC showed normal filling pressures and preserved cardiac output. Hospital course c/b acute kidney injury due to contrast (Cr peaked at 1.61 from 1.25). Underwent diuresis and was switched to Entresto.  Has had a cough since December. Had orthopnea,  no longer with orthopnea.  Cough is productive of clear mucous, scant.  Reports wheezing in December, not now.  When he takes a deep breath he coughs.   Occassional tickle in throat, denies nasal congestion or heartburn.   Never diagnosed with asthma.  no swelling in legs, never did.   He is on albuterol and budesonide nebs twice daily, carvedilol, fenofibrate, furosemide, glimepiride, isordil, pravastatin, valsartan-sacubitril, and tizanidine  No FH of COPD or emphysema.  Entresto was stopped after the last hospitalization and he is now on hydralazine.  He also takes furosemide. [TextBox_11] : 1 [TextBox_13] : 34 [YearQuit] : 2011

## 2024-08-01 NOTE — ASSESSMENT
[FreeTextEntry1] : 63 year old [employer]  with history of CAD, p CABG, HLD, HTN, PAD, NIDDM, HFrEF. former >20pack year tobacco use history who quit in 2011 who presents for evaluation of a cough.  He was recently admitted in mid july with CHF exacerbation, had bilateral pleural effusions, was diuresed.  Also with CKD. When he was admitted he reported orthopnea and significant cough.  Now he has lost weight, no longer has orthopnea but continues to cough. He has some features of Upper airway cough syndrome.  Differential includes neurogenic cough, CHF, UACS, COPD. US in the office today showed minimal bilateral pleural effusions and no evidence of pulmonary edema.   Plan: 1- complete PFT, exhaled NO 2- fluticasone nasal spray bilaterally 3- benzonatate for cough.

## 2024-08-01 NOTE — PROCEDURE
[Simple] : simple [A line] : A line: Yes [FreeTextEntry2] : minimal bilateral pleural effusions No evidence of pulmonary edema Images saved on Qpath.

## 2024-08-02 DIAGNOSIS — R06.2 WHEEZING: ICD-10-CM

## 2024-08-02 DIAGNOSIS — I50.42 CHRONIC COMBINED SYSTOLIC (CONGESTIVE) AND DIASTOLIC (CONGESTIVE) HEART FAILURE: ICD-10-CM

## 2024-08-15 ENCOUNTER — APPOINTMENT (OUTPATIENT)
Dept: VASCULAR SURGERY | Facility: CLINIC | Age: 64
End: 2024-08-15
Payer: MEDICAID

## 2024-08-15 VITALS
BODY MASS INDEX: 25.49 KG/M2 | HEIGHT: 65 IN | WEIGHT: 153 LBS | DIASTOLIC BLOOD PRESSURE: 78 MMHG | TEMPERATURE: 98.1 F | SYSTOLIC BLOOD PRESSURE: 147 MMHG | HEART RATE: 66 BPM

## 2024-08-15 VITALS — DIASTOLIC BLOOD PRESSURE: 85 MMHG | HEART RATE: 69 BPM | SYSTOLIC BLOOD PRESSURE: 153 MMHG

## 2024-08-15 DIAGNOSIS — I65.23 OCCLUSION AND STENOSIS OF BILATERAL CAROTID ARTERIES: ICD-10-CM

## 2024-08-15 DIAGNOSIS — I73.9 PERIPHERAL VASCULAR DISEASE, UNSPECIFIED: ICD-10-CM

## 2024-08-15 PROCEDURE — 93880 EXTRACRANIAL BILAT STUDY: CPT

## 2024-08-15 PROCEDURE — 93923 UPR/LXTR ART STDY 3+ LVLS: CPT

## 2024-08-15 PROCEDURE — 99213 OFFICE O/P EST LOW 20 MIN: CPT

## 2024-08-15 NOTE — ASSESSMENT
[FreeTextEntry1] : A/P  PAD with short distance RLE claudication.  Patient is not a candidate for cilostazol therapy.  Recommend walking/exercise regimen 3-4x week for 30 minutes. Foot/skin hygiene discussed.  If symptoms worsen, may consider RLE angiogram. Patient is not agreeable to intervention at this time.  Follow up in 6 months for PVR - advised to call our office for earlier evaluation if new or concerning symptoms.   Asymptomatic moderate carotid artery stenosis.  Advise medical management and surveillance.  Continue with antiplatelet and statin.  Follow up in 6 months for repeat duplex.

## 2024-08-15 NOTE — PHYSICAL EXAM
[2+] : left 2+ [0] : right 0 [1+] : left 1+ [Calm] : calm [Skin Ulcer] : no ulcer [de-identified] : well appearing male, NAD [de-identified] : unlabored breathing [FreeTextEntry1] : feet warm and well perfused [de-identified] : soft, NT

## 2024-08-20 ENCOUNTER — NON-APPOINTMENT (OUTPATIENT)
Age: 64
End: 2024-08-20

## 2024-08-20 ENCOUNTER — APPOINTMENT (OUTPATIENT)
Dept: CARDIOLOGY | Facility: CLINIC | Age: 64
End: 2024-08-20
Payer: MEDICAID

## 2024-08-20 VITALS
BODY MASS INDEX: 25.72 KG/M2 | WEIGHT: 154.4 LBS | DIASTOLIC BLOOD PRESSURE: 82 MMHG | OXYGEN SATURATION: 98 % | HEIGHT: 65 IN | HEART RATE: 79 BPM | SYSTOLIC BLOOD PRESSURE: 157 MMHG

## 2024-08-20 VITALS — DIASTOLIC BLOOD PRESSURE: 94 MMHG | SYSTOLIC BLOOD PRESSURE: 169 MMHG

## 2024-08-20 DIAGNOSIS — I25.10 ATHEROSCLEROTIC HEART DISEASE OF NATIVE CORONARY ARTERY W/OUT ANGINA PECTORIS: ICD-10-CM

## 2024-08-20 DIAGNOSIS — I10 ESSENTIAL (PRIMARY) HYPERTENSION: ICD-10-CM

## 2024-08-20 DIAGNOSIS — I25.5 ISCHEMIC CARDIOMYOPATHY: ICD-10-CM

## 2024-08-20 DIAGNOSIS — E78.5 HYPERLIPIDEMIA, UNSPECIFIED: ICD-10-CM

## 2024-08-20 PROCEDURE — 93000 ELECTROCARDIOGRAM COMPLETE: CPT

## 2024-08-20 PROCEDURE — 99214 OFFICE O/P EST MOD 30 MIN: CPT | Mod: 25

## 2024-08-20 RX ORDER — EVOLOCUMAB 140 MG/ML
140 INJECTION, SOLUTION SUBCUTANEOUS
Qty: 6 | Refills: 5 | Status: ACTIVE | COMMUNITY
Start: 2024-08-20 | End: 1900-01-01

## 2024-08-20 RX ORDER — SACUBITRIL AND VALSARTAN 24; 26 MG/1; MG/1
24-26 TABLET, FILM COATED ORAL
Refills: 0 | Status: ACTIVE | COMMUNITY

## 2024-08-20 RX ORDER — METFORMIN HYDROCHLORIDE 1000 MG/1
1000 TABLET, COATED ORAL
Refills: 0 | Status: ACTIVE | COMMUNITY

## 2024-08-20 NOTE — REASON FOR VISIT
[Symptom and Test Evaluation] : symptom and test evaluation [Cardiac Failure] : cardiac failure [Coronary Artery Disease] : coronary artery disease [Carotid, Aortic and Peripheral Vascular Disease] : carotid, aortic and peripheral vascular disease

## 2024-08-20 NOTE — REVIEW OF SYSTEMS
[Fever] : no fever [Blurry Vision] : no blurred vision [Earache] : no earache [SOB] : no shortness of breath [Dyspnea on exertion] : not dyspnea during exertion [Leg Claudication] : intermittent leg claudication

## 2024-08-20 NOTE — HISTORY OF PRESENT ILLNESS
[FreeTextEntry1] : 63-year-old male history of coronary artery bypass, recent admission for heart failure NSTEMI cardiac cath shows patent grafts his right heart cath showed normal hemodynamics.  Clinically he feels a lot better he denies any shortness of breath or chest pain.  He still has claudication symptoms of three quarters of a block but only recently has tried to walk more.

## 2024-08-20 NOTE — PHYSICAL EXAM
[Well Developed] : well developed [Normal Conjunctiva] : normal conjunctiva [Normal Venous Pressure] : normal venous pressure [No Carotid Bruit] : no carotid bruit [Normal S1, S2] : normal S1, S2

## 2024-09-19 ENCOUNTER — NON-APPOINTMENT (OUTPATIENT)
Age: 64
End: 2024-09-19

## 2024-09-20 ENCOUNTER — NON-APPOINTMENT (OUTPATIENT)
Age: 64
End: 2024-09-20

## 2024-09-20 ENCOUNTER — APPOINTMENT (OUTPATIENT)
Dept: HEART FAILURE | Facility: CLINIC | Age: 64
End: 2024-09-20
Payer: MEDICAID

## 2024-09-20 VITALS — SYSTOLIC BLOOD PRESSURE: 124 MMHG | DIASTOLIC BLOOD PRESSURE: 73 MMHG

## 2024-09-20 VITALS
BODY MASS INDEX: 26.82 KG/M2 | HEIGHT: 65 IN | WEIGHT: 161 LBS | DIASTOLIC BLOOD PRESSURE: 68 MMHG | OXYGEN SATURATION: 98 % | SYSTOLIC BLOOD PRESSURE: 143 MMHG | HEART RATE: 71 BPM

## 2024-09-20 DIAGNOSIS — I50.42 CHRONIC COMBINED SYSTOLIC (CONGESTIVE) AND DIASTOLIC (CONGESTIVE) HEART FAILURE: ICD-10-CM

## 2024-09-20 DIAGNOSIS — I25.10 ATHEROSCLEROTIC HEART DISEASE OF NATIVE CORONARY ARTERY W/OUT ANGINA PECTORIS: ICD-10-CM

## 2024-09-20 PROCEDURE — 99214 OFFICE O/P EST MOD 30 MIN: CPT | Mod: 25

## 2024-09-20 PROCEDURE — 93000 ELECTROCARDIOGRAM COMPLETE: CPT

## 2024-09-20 RX ORDER — ISOSORBIDE MONONITRATE 30 MG/1
30 TABLET, EXTENDED RELEASE ORAL DAILY
Qty: 30 | Refills: 0 | Status: ACTIVE | COMMUNITY
Start: 2024-09-20

## 2024-09-20 RX ORDER — HYDRALAZINE HYDROCHLORIDE 50 MG/1
50 TABLET ORAL
Qty: 270 | Refills: 1 | Status: ACTIVE | COMMUNITY
Start: 2024-09-20

## 2024-09-20 NOTE — PHYSICAL EXAM
[Well Developed] : well developed [Well Nourished] : well nourished [No Acute Distress] : no acute distress [Normal Venous Pressure] : normal venous pressure [No Carotid Bruit] : no carotid bruit [Normal S1, S2] : normal S1, S2 [No Murmur] : no murmur [No Rub] : no rub [No Gallop] : no gallop [No Respiratory Distress] : no respiratory distress  [Soft] : abdomen soft [Normal Bowel Sounds] : normal bowel sounds [Normal Gait] : normal gait [No Edema] : no edema [No Varicosities] : no varicosities [No Skin Lesions] : no skin lesions [Moves all extremities] : moves all extremities [No Focal Deficits] : no focal deficits [Alert and Oriented] : alert and oriented [Normal memory] : normal memory [Clear Lung Fields] : clear lung fields [Normal Speech] : normal speech [de-identified] : JVP approx 8-10 with mild-mod HJR [de-identified] : RRR

## 2024-09-20 NOTE — HISTORY OF PRESENT ILLNESS
[FreeTextEntry1] : Mr. Smyth is a 64 y/o M w/ h/o CAD s/p CABG (2011), HL, NIDDM (A1c 7.4 7/24), HFrEF (EF 30-35%, LVEDD 6 cm), HTN, PAD (medically managed), prior tobacco use (10 pk-yr; quit 2011) who presents for establishment of care. Also followed by Dr. Keren Spaulding (primary cardiologist).  For full initial details, please refer to visit note on 7/30/2024 Since last visit, reports cough has improved/resolved after stopping entresto.  Uses 2 flat pillows w/o PND. Sleeps on his side. Denies bendopnea. Adherent to sodium diet.   ET unchanged limited to 1 block due to bilateral calf fatigue; improved with rest. Has appointment with Dr. Watkins (noted to have R sided PAD). Of note had not been taking Pravastatin (possibly intolerant to other statins). Lipid panel has been markedly elevated (Tchol 311, ; 6/19/24). Able to go up 13 stairs w/o stopping.  He was prescribed Repatha but not taking yet and will follow up with pharmacy  Monitors weight. Weight has been 153-160 pounds. He takes additional lasix 40 mg 1-2 times when he notes his weight elevated to 158 or when he eats out  BP at home is -160's with 1 reading 106. He reports never continuing Imdur and had decreased Hydral 50 mg HS because he felt like he wanted to black out on TID dosing.   Labs 7/30 BNP 6280 K 4.7 BUN/Cr 26/2.01  Denies chest pain, palpitations, SOB, dizziness

## 2024-09-20 NOTE — CARDIOLOGY SUMMARY
[de-identified] : 7/30/24 - NSR, first deg AV block, RBBB  [de-identified] : 7/18/24 - EF 25-30% (read as 30-35%), LVEDD 6 cm, pleural effusion, mod MR,  msec, E/e' 22, LVOT VTI 9.5, IVC 1.6 cm [de-identified] : 7/19/24 - LM mild athero, LAD , LCx severe atherosclerosis, RCA ; LIMA-mLAD patent, SVG-OM1 patent, SVG (jump) RPDA patent; /68/88, RA 5, RV 41/7, PA 41/15/26, PCWP 14 (v 17), CO/CI 4.42/2.4

## 2024-09-20 NOTE — ASSESSMENT
[FreeTextEntry1] : Mr. Smyth is a 64 y/o M w/ h/o CAD s/p CABG (2011), HL, NIDDM (A1c 7.4 7/24), HFrEF (EF 30-35%, LVEDD 6 cm), HTN, PAD (medically managed), prior tobacco use (10 pk-yr; quit 2011) who presents for establishment of care. Currently reports NYHA class II-III symptoms and mildly overloaded. BP has been elevated above goal  # HFrEF/ICM - continue lasix 40 mg daily; instructed to take 40 mg twice/day for 2 days then daily with extra as needed for weight gain of 2-3 pounds - discussed resuming hydral 50 mg q8h - hold for SBP < 90 and call office if he experiences dizziness - c/w coreg 25 mg twice/day; HR above goal; possible Corlanor consideration - c/w imdur 30 mg daily - c/w Jardiance 10 mg daily - counseled about disease process - maintain log of weight/BP  # CAD - s/p CABG - c/w ASA 81 mg daily  - lipid panel poorly controlled; hadn't been taking Pravastatin and intolerant to other statins; would be a good candidate for PCSK9i; will d/w Dr. Spaulding -f/u with pharmacy to start repatha  # DM - A1c 7.4 - needs better glycemic control - on Jardiance, glimepiride   F/u with Dr Guerra as scheduled in November

## 2024-10-09 ENCOUNTER — APPOINTMENT (OUTPATIENT)
Dept: PULMONOLOGY | Facility: CLINIC | Age: 64
End: 2024-10-09

## 2024-10-17 ENCOUNTER — APPOINTMENT (OUTPATIENT)
Dept: INTERNAL MEDICINE | Facility: CLINIC | Age: 64
End: 2024-10-17
Payer: MEDICAID

## 2024-10-17 VITALS
BODY MASS INDEX: 26.29 KG/M2 | OXYGEN SATURATION: 97 % | SYSTOLIC BLOOD PRESSURE: 144 MMHG | DIASTOLIC BLOOD PRESSURE: 72 MMHG | HEART RATE: 88 BPM | WEIGHT: 158 LBS | TEMPERATURE: 98.2 F

## 2024-10-17 DIAGNOSIS — I25.10 ATHEROSCLEROTIC HEART DISEASE OF NATIVE CORONARY ARTERY W/OUT ANGINA PECTORIS: ICD-10-CM

## 2024-10-17 DIAGNOSIS — E78.5 HYPERLIPIDEMIA, UNSPECIFIED: ICD-10-CM

## 2024-10-17 DIAGNOSIS — I50.42 CHRONIC COMBINED SYSTOLIC (CONGESTIVE) AND DIASTOLIC (CONGESTIVE) HEART FAILURE: ICD-10-CM

## 2024-10-17 DIAGNOSIS — E11.9 TYPE 2 DIABETES MELLITUS W/OUT COMPLICATIONS: ICD-10-CM

## 2024-10-17 DIAGNOSIS — I10 ESSENTIAL (PRIMARY) HYPERTENSION: ICD-10-CM

## 2024-10-17 PROCEDURE — 90662 IIV NO PRSV INCREASED AG IM: CPT

## 2024-10-17 PROCEDURE — 36415 COLL VENOUS BLD VENIPUNCTURE: CPT

## 2024-10-17 PROCEDURE — G0008: CPT

## 2024-10-17 PROCEDURE — 99214 OFFICE O/P EST MOD 30 MIN: CPT | Mod: 25

## 2024-10-18 LAB
ANION GAP SERPL CALC-SCNC: 16 MMOL/L
BUN SERPL-MCNC: 24 MG/DL
CALCIUM SERPL-MCNC: 9.6 MG/DL
CHLORIDE SERPL-SCNC: 105 MMOL/L
CHOLEST SERPL-MCNC: 313 MG/DL
CO2 SERPL-SCNC: 23 MMOL/L
CREAT SERPL-MCNC: 1.33 MG/DL
EGFR: 59 ML/MIN/1.73M2
ESTIMATED AVERAGE GLUCOSE: 169 MG/DL
GLUCOSE SERPL-MCNC: 81 MG/DL
HBA1C MFR BLD HPLC: 7.5 %
HDLC SERPL-MCNC: 44 MG/DL
LDLC SERPL CALC-MCNC: 209 MG/DL
NONHDLC SERPL-MCNC: 269 MG/DL
NT-PROBNP SERPL-MCNC: 2795 PG/ML
POTASSIUM SERPL-SCNC: 4.3 MMOL/L
SODIUM SERPL-SCNC: 143 MMOL/L
TRIGL SERPL-MCNC: 296 MG/DL

## 2024-11-13 ENCOUNTER — APPOINTMENT (OUTPATIENT)
Dept: CARDIOLOGY | Facility: CLINIC | Age: 64
End: 2024-11-13

## 2024-11-13 ENCOUNTER — APPOINTMENT (OUTPATIENT)
Dept: HEART FAILURE | Facility: CLINIC | Age: 64
End: 2024-11-13

## 2024-11-14 ENCOUNTER — RX RENEWAL (OUTPATIENT)
Age: 64
End: 2024-11-14

## 2024-12-13 ENCOUNTER — RX RENEWAL (OUTPATIENT)
Age: 64
End: 2024-12-13

## 2024-12-16 ENCOUNTER — RX RENEWAL (OUTPATIENT)
Age: 64
End: 2024-12-16

## 2025-01-28 ENCOUNTER — APPOINTMENT (OUTPATIENT)
Dept: INTERNAL MEDICINE | Facility: CLINIC | Age: 65
End: 2025-01-28
Payer: MEDICAID

## 2025-01-28 VITALS
TEMPERATURE: 97.7 F | HEIGHT: 65 IN | SYSTOLIC BLOOD PRESSURE: 140 MMHG | OXYGEN SATURATION: 97 % | BODY MASS INDEX: 25.16 KG/M2 | HEART RATE: 90 BPM | WEIGHT: 151 LBS | DIASTOLIC BLOOD PRESSURE: 90 MMHG

## 2025-01-28 DIAGNOSIS — E11.42 TYPE 2 DIABETES MELLITUS WITH DIABETIC POLYNEUROPATHY: ICD-10-CM

## 2025-01-28 DIAGNOSIS — Z00.00 ENCOUNTER FOR GENERAL ADULT MEDICAL EXAMINATION W/OUT ABNORMAL FINDINGS: ICD-10-CM

## 2025-01-28 DIAGNOSIS — I50.42 CHRONIC COMBINED SYSTOLIC (CONGESTIVE) AND DIASTOLIC (CONGESTIVE) HEART FAILURE: ICD-10-CM

## 2025-01-28 PROCEDURE — 99397 PER PM REEVAL EST PAT 65+ YR: CPT

## 2025-01-28 PROCEDURE — 36415 COLL VENOUS BLD VENIPUNCTURE: CPT

## 2025-02-03 ENCOUNTER — APPOINTMENT (OUTPATIENT)
Dept: INTERNAL MEDICINE | Facility: CLINIC | Age: 65
End: 2025-02-03

## 2025-02-06 DIAGNOSIS — E11.9 TYPE 2 DIABETES MELLITUS W/OUT COMPLICATIONS: ICD-10-CM

## 2025-02-06 DIAGNOSIS — D64.9 ANEMIA, UNSPECIFIED: ICD-10-CM

## 2025-02-06 LAB
ALBUMIN SERPL ELPH-MCNC: 3.9 G/DL
ALP BLD-CCNC: 142 U/L
ALT SERPL-CCNC: 21 U/L
ANION GAP SERPL CALC-SCNC: 14 MMOL/L
AST SERPL-CCNC: 23 U/L
BILIRUB SERPL-MCNC: 0.4 MG/DL
BUN SERPL-MCNC: 27 MG/DL
CALCIUM SERPL-MCNC: 9.2 MG/DL
CHLORIDE SERPL-SCNC: 107 MMOL/L
CHOLEST SERPL-MCNC: 237 MG/DL
CO2 SERPL-SCNC: 23 MMOL/L
CREAT SERPL-MCNC: 1.82 MG/DL
EGFR: 41 ML/MIN/1.73M2
ESTIMATED AVERAGE GLUCOSE: 203 MG/DL
GLUCOSE SERPL-MCNC: 122 MG/DL
HBA1C MFR BLD HPLC: 8.7 %
HCT VFR BLD CALC: 40 %
HDLC SERPL-MCNC: 38 MG/DL
HGB BLD-MCNC: 11.9 G/DL
LDLC SERPL CALC-MCNC: 164 MG/DL
MCHC RBC-ENTMCNC: 28.3 PG
MCHC RBC-ENTMCNC: 29.8 G/DL
MCV RBC AUTO: 95.2 FL
NONHDLC SERPL-MCNC: 199 MG/DL
PLATELET # BLD AUTO: 218 K/UL
POTASSIUM SERPL-SCNC: 5.3 MMOL/L
PROT SERPL-MCNC: 7.6 G/DL
PSA SERPL-MCNC: 1.29 NG/ML
RBC # BLD: 4.2 M/UL
RBC # FLD: 16.9 %
SODIUM SERPL-SCNC: 145 MMOL/L
TRIGL SERPL-MCNC: 187 MG/DL
TSH SERPL-ACNC: 1.26 UIU/ML
WBC # FLD AUTO: 6.72 K/UL

## 2025-02-06 RX ORDER — ROSUVASTATIN CALCIUM 10 MG/1
10 TABLET, FILM COATED ORAL
Qty: 90 | Refills: 0 | Status: ACTIVE | COMMUNITY
Start: 2025-01-28 | End: 1900-01-01

## 2025-02-20 ENCOUNTER — APPOINTMENT (OUTPATIENT)
Dept: VASCULAR SURGERY | Facility: CLINIC | Age: 65
End: 2025-02-20
Payer: MEDICAID

## 2025-02-20 VITALS
WEIGHT: 150 LBS | HEIGHT: 65.5 IN | BODY MASS INDEX: 24.69 KG/M2 | TEMPERATURE: 97.4 F | DIASTOLIC BLOOD PRESSURE: 109 MMHG | SYSTOLIC BLOOD PRESSURE: 160 MMHG | HEART RATE: 80 BPM

## 2025-02-20 DIAGNOSIS — I65.23 OCCLUSION AND STENOSIS OF BILATERAL CAROTID ARTERIES: ICD-10-CM

## 2025-02-20 DIAGNOSIS — I73.9 PERIPHERAL VASCULAR DISEASE, UNSPECIFIED: ICD-10-CM

## 2025-02-20 PROCEDURE — 93923 UPR/LXTR ART STDY 3+ LVLS: CPT

## 2025-02-20 PROCEDURE — 99213 OFFICE O/P EST LOW 20 MIN: CPT

## 2025-02-20 PROCEDURE — 93880 EXTRACRANIAL BILAT STUDY: CPT

## 2025-03-05 ENCOUNTER — NON-APPOINTMENT (OUTPATIENT)
Age: 65
End: 2025-03-05

## 2025-03-05 ENCOUNTER — APPOINTMENT (OUTPATIENT)
Dept: HEART FAILURE | Facility: CLINIC | Age: 65
End: 2025-03-05
Payer: MEDICAID

## 2025-03-05 VITALS
RESPIRATION RATE: 17 BRPM | HEART RATE: 92 BPM | TEMPERATURE: 97.3 F | WEIGHT: 151.56 LBS | SYSTOLIC BLOOD PRESSURE: 136 MMHG | HEIGHT: 65.5 IN | BODY MASS INDEX: 24.95 KG/M2 | OXYGEN SATURATION: 96 % | DIASTOLIC BLOOD PRESSURE: 79 MMHG

## 2025-03-05 PROCEDURE — 93000 ELECTROCARDIOGRAM COMPLETE: CPT

## 2025-03-05 PROCEDURE — 99214 OFFICE O/P EST MOD 30 MIN: CPT

## 2025-03-05 PROCEDURE — G2211 COMPLEX E/M VISIT ADD ON: CPT | Mod: NC

## 2025-03-05 RX ORDER — BENZONATATE 100 MG/1
100 CAPSULE ORAL
Refills: 0 | Status: ACTIVE | COMMUNITY
Start: 2025-03-05

## 2025-03-11 ENCOUNTER — APPOINTMENT (OUTPATIENT)
Dept: INTERNAL MEDICINE | Facility: CLINIC | Age: 65
End: 2025-03-11
Payer: MEDICAID

## 2025-03-11 VITALS
HEART RATE: 78 BPM | WEIGHT: 153 LBS | TEMPERATURE: 97.3 F | DIASTOLIC BLOOD PRESSURE: 83 MMHG | BODY MASS INDEX: 25.19 KG/M2 | HEIGHT: 65.5 IN | SYSTOLIC BLOOD PRESSURE: 132 MMHG | OXYGEN SATURATION: 98 %

## 2025-03-11 DIAGNOSIS — I10 ESSENTIAL (PRIMARY) HYPERTENSION: ICD-10-CM

## 2025-03-11 DIAGNOSIS — R05.3 CHRONIC COUGH: ICD-10-CM

## 2025-03-11 DIAGNOSIS — E78.5 HYPERLIPIDEMIA, UNSPECIFIED: ICD-10-CM

## 2025-03-11 DIAGNOSIS — E11.9 TYPE 2 DIABETES MELLITUS W/OUT COMPLICATIONS: ICD-10-CM

## 2025-03-11 DIAGNOSIS — I50.42 CHRONIC COMBINED SYSTOLIC (CONGESTIVE) AND DIASTOLIC (CONGESTIVE) HEART FAILURE: ICD-10-CM

## 2025-03-11 PROCEDURE — 99214 OFFICE O/P EST MOD 30 MIN: CPT

## 2025-03-11 PROCEDURE — 36415 COLL VENOUS BLD VENIPUNCTURE: CPT

## 2025-03-11 RX ORDER — ALBUTEROL SULFATE 90 UG/1
108 (90 BASE) INHALANT RESPIRATORY (INHALATION)
Qty: 1 | Refills: 3 | Status: ACTIVE | COMMUNITY
Start: 2025-03-11 | End: 1900-01-01

## 2025-03-12 LAB
FERRITIN SERPL-MCNC: 130 NG/ML
FOLATE SERPL-MCNC: 13.1 NG/ML
HCT VFR BLD CALC: 40 %
HGB BLD-MCNC: 12.2 G/DL
IRON SATN MFR SERPL: 20 %
IRON SERPL-MCNC: 65 UG/DL
NT-PROBNP SERPL-MCNC: 8546 PG/ML
TIBC SERPL-MCNC: 332 UG/DL
UIBC SERPL-MCNC: 267 UG/DL
VIT B12 SERPL-MCNC: 976 PG/ML

## 2025-04-02 ENCOUNTER — RX RENEWAL (OUTPATIENT)
Age: 65
End: 2025-04-02

## 2025-05-06 ENCOUNTER — APPOINTMENT (OUTPATIENT)
Dept: INTERNAL MEDICINE | Facility: CLINIC | Age: 65
End: 2025-05-06

## 2025-05-08 ENCOUNTER — APPOINTMENT (OUTPATIENT)
Dept: HEART FAILURE | Facility: CLINIC | Age: 65
End: 2025-05-08

## 2025-07-10 ENCOUNTER — APPOINTMENT (OUTPATIENT)
Dept: HEART FAILURE | Facility: CLINIC | Age: 65
End: 2025-07-10

## 2025-08-21 ENCOUNTER — APPOINTMENT (OUTPATIENT)
Dept: VASCULAR SURGERY | Facility: CLINIC | Age: 65
End: 2025-08-21